# Patient Record
Sex: MALE | Race: ASIAN | NOT HISPANIC OR LATINO | URBAN - METROPOLITAN AREA
[De-identification: names, ages, dates, MRNs, and addresses within clinical notes are randomized per-mention and may not be internally consistent; named-entity substitution may affect disease eponyms.]

---

## 2023-03-16 ENCOUNTER — INPATIENT (INPATIENT)
Facility: HOSPITAL | Age: 48
LOS: 3 days | Discharge: HOME | DRG: 64 | End: 2023-03-20
Attending: PSYCHIATRY & NEUROLOGY | Admitting: PSYCHIATRY & NEUROLOGY
Payer: MEDICAID

## 2023-03-16 VITALS
DIASTOLIC BLOOD PRESSURE: 100 MMHG | WEIGHT: 184.97 LBS | OXYGEN SATURATION: 98 % | HEIGHT: 70 IN | SYSTOLIC BLOOD PRESSURE: 153 MMHG | HEART RATE: 77 BPM | RESPIRATION RATE: 19 BRPM | TEMPERATURE: 98 F

## 2023-03-16 PROCEDURE — 99285 EMERGENCY DEPT VISIT HI MDM: CPT

## 2023-03-16 NOTE — ED ADULT TRIAGE NOTE - CHIEF COMPLAINT QUOTE
C/o dizziness and vomiting since Tuesday. Denies abdominal pain, fever. Seen at [place] and was prescribed Meclizine, states no relief

## 2023-03-16 NOTE — ED ADULT TRIAGE NOTE - GLASGOW COMA SCALE: BEST MOTOR RESPONSE, MLM
Medication(s) Requested: tramadol  Last office visit: 10/29/18  Last refill: 11/5/18  Is the patient due for refill of this medication(s): Yes  PDMP review: Criteria met. Forwarded to Physician/JAYLON for signature.     
Patient is looking for refill of Tramadol to [facility] in [place] and [facility] and she is taking the last one today.    Please call.       
Prescription e prescribed and  Voice message left with patient    
(M6) obeys commands

## 2023-03-17 DIAGNOSIS — I77.74 DISSECTION OF VERTEBRAL ARTERY: ICD-10-CM

## 2023-03-17 LAB
A1C WITH ESTIMATED AVERAGE GLUCOSE RESULT: 7.4 % — HIGH (ref 4–5.6)
ALBUMIN SERPL ELPH-MCNC: 4.3 G/DL — SIGNIFICANT CHANGE UP (ref 3.3–5)
ALP SERPL-CCNC: 76 U/L — SIGNIFICANT CHANGE UP (ref 40–120)
ALT FLD-CCNC: 16 U/L — SIGNIFICANT CHANGE UP (ref 10–45)
ANION GAP SERPL CALC-SCNC: 12 MMOL/L — SIGNIFICANT CHANGE UP (ref 5–17)
AST SERPL-CCNC: 15 U/L — SIGNIFICANT CHANGE UP (ref 10–40)
BASOPHILS # BLD AUTO: 0.03 K/UL — SIGNIFICANT CHANGE UP (ref 0–0.2)
BASOPHILS NFR BLD AUTO: 0.2 % — SIGNIFICANT CHANGE UP (ref 0–2)
BILIRUB SERPL-MCNC: 0.4 MG/DL — SIGNIFICANT CHANGE UP (ref 0.2–1.2)
BUN SERPL-MCNC: 14 MG/DL — SIGNIFICANT CHANGE UP (ref 7–23)
CALCIUM SERPL-MCNC: 9.4 MG/DL — SIGNIFICANT CHANGE UP (ref 8.4–10.5)
CHLORIDE SERPL-SCNC: 96 MMOL/L — SIGNIFICANT CHANGE UP (ref 96–108)
CHOLEST SERPL-MCNC: 192 MG/DL — SIGNIFICANT CHANGE UP
CO2 SERPL-SCNC: 27 MMOL/L — SIGNIFICANT CHANGE UP (ref 22–31)
CREAT SERPL-MCNC: 0.88 MG/DL — SIGNIFICANT CHANGE UP (ref 0.5–1.3)
CRP SERPL-MCNC: 5 MG/L — HIGH (ref 0–4)
EGFR: 105 ML/MIN/1.73M2 — SIGNIFICANT CHANGE UP
EOSINOPHIL # BLD AUTO: 0 K/UL — SIGNIFICANT CHANGE UP (ref 0–0.5)
EOSINOPHIL NFR BLD AUTO: 0 % — SIGNIFICANT CHANGE UP (ref 0–6)
ESTIMATED AVERAGE GLUCOSE: 166 MG/DL — HIGH (ref 68–114)
FLUAV AG NPH QL: SIGNIFICANT CHANGE UP
FLUBV AG NPH QL: SIGNIFICANT CHANGE UP
GLUCOSE SERPL-MCNC: 180 MG/DL — HIGH (ref 70–99)
HCT VFR BLD CALC: 41.2 % — SIGNIFICANT CHANGE UP (ref 39–50)
HDLC SERPL-MCNC: 42 MG/DL — SIGNIFICANT CHANGE UP
HGB BLD-MCNC: 13.1 G/DL — SIGNIFICANT CHANGE UP (ref 13–17)
IMM GRANULOCYTES NFR BLD AUTO: 1.1 % — HIGH (ref 0–0.9)
LIPID PNL WITH DIRECT LDL SERPL: 114 MG/DL — HIGH
LYMPHOCYTES # BLD AUTO: 1.29 K/UL — SIGNIFICANT CHANGE UP (ref 1–3.3)
LYMPHOCYTES # BLD AUTO: 8.3 % — LOW (ref 13–44)
MCHC RBC-ENTMCNC: 26.5 PG — LOW (ref 27–34)
MCHC RBC-ENTMCNC: 31.8 GM/DL — LOW (ref 32–36)
MCV RBC AUTO: 83.4 FL — SIGNIFICANT CHANGE UP (ref 80–100)
MONOCYTES # BLD AUTO: 0.66 K/UL — SIGNIFICANT CHANGE UP (ref 0–0.9)
MONOCYTES NFR BLD AUTO: 4.3 % — SIGNIFICANT CHANGE UP (ref 2–14)
NEUTROPHILS # BLD AUTO: 13.36 K/UL — HIGH (ref 1.8–7.4)
NEUTROPHILS NFR BLD AUTO: 86.1 % — HIGH (ref 43–77)
NON HDL CHOLESTEROL: 150 MG/DL — HIGH
NRBC # BLD: 0 /100 WBCS — SIGNIFICANT CHANGE UP (ref 0–0)
PLATELET # BLD AUTO: 271 K/UL — SIGNIFICANT CHANGE UP (ref 150–400)
POTASSIUM SERPL-MCNC: 4.2 MMOL/L — SIGNIFICANT CHANGE UP (ref 3.5–5.3)
POTASSIUM SERPL-SCNC: 4.2 MMOL/L — SIGNIFICANT CHANGE UP (ref 3.5–5.3)
PROT SERPL-MCNC: 7.9 G/DL — SIGNIFICANT CHANGE UP (ref 6–8.3)
RBC # BLD: 4.94 M/UL — SIGNIFICANT CHANGE UP (ref 4.2–5.8)
RBC # FLD: 12.4 % — SIGNIFICANT CHANGE UP (ref 10.3–14.5)
RSV RNA NPH QL NAA+NON-PROBE: SIGNIFICANT CHANGE UP
SARS-COV-2 RNA SPEC QL NAA+PROBE: SIGNIFICANT CHANGE UP
SODIUM SERPL-SCNC: 135 MMOL/L — SIGNIFICANT CHANGE UP (ref 135–145)
TRIGL SERPL-MCNC: 178 MG/DL — HIGH
WBC # BLD: 15.51 K/UL — HIGH (ref 3.8–10.5)
WBC # FLD AUTO: 15.51 K/UL — HIGH (ref 3.8–10.5)

## 2023-03-17 PROCEDURE — 93306 TTE W/DOPPLER COMPLETE: CPT | Mod: 26

## 2023-03-17 PROCEDURE — 70551 MRI BRAIN STEM W/O DYE: CPT | Mod: 26

## 2023-03-17 PROCEDURE — 99291 CRITICAL CARE FIRST HOUR: CPT

## 2023-03-17 PROCEDURE — 70498 CT ANGIOGRAPHY NECK: CPT | Mod: 26,MG

## 2023-03-17 PROCEDURE — 70547 MR ANGIOGRAPHY NECK W/O DYE: CPT | Mod: 26

## 2023-03-17 PROCEDURE — 70496 CT ANGIOGRAPHY HEAD: CPT | Mod: 26,MG

## 2023-03-17 PROCEDURE — G1004: CPT

## 2023-03-17 RX ORDER — ASPIRIN/CALCIUM CARB/MAGNESIUM 324 MG
81 TABLET ORAL DAILY
Refills: 0 | Status: DISCONTINUED | OUTPATIENT
Start: 2023-03-17 | End: 2023-03-20

## 2023-03-17 RX ORDER — ENOXAPARIN SODIUM 100 MG/ML
40 INJECTION SUBCUTANEOUS EVERY 24 HOURS
Refills: 0 | Status: DISCONTINUED | OUTPATIENT
Start: 2023-03-17 | End: 2023-03-20

## 2023-03-17 RX ORDER — ATORVASTATIN CALCIUM 80 MG/1
80 TABLET, FILM COATED ORAL AT BEDTIME
Refills: 0 | Status: DISCONTINUED | OUTPATIENT
Start: 2023-03-17 | End: 2023-03-20

## 2023-03-17 RX ORDER — SODIUM CHLORIDE 9 MG/ML
1000 INJECTION, SOLUTION INTRAVENOUS
Refills: 0 | Status: DISCONTINUED | OUTPATIENT
Start: 2023-03-17 | End: 2023-03-20

## 2023-03-17 RX ORDER — INSULIN LISPRO 100/ML
VIAL (ML) SUBCUTANEOUS AT BEDTIME
Refills: 0 | Status: DISCONTINUED | OUTPATIENT
Start: 2023-03-17 | End: 2023-03-20

## 2023-03-17 RX ORDER — DEXTROSE 50 % IN WATER 50 %
25 SYRINGE (ML) INTRAVENOUS ONCE
Refills: 0 | Status: DISCONTINUED | OUTPATIENT
Start: 2023-03-17 | End: 2023-03-20

## 2023-03-17 RX ORDER — ASPIRIN/CALCIUM CARB/MAGNESIUM 324 MG
81 TABLET ORAL DAILY
Refills: 0 | Status: DISCONTINUED | OUTPATIENT
Start: 2023-03-17 | End: 2023-03-17

## 2023-03-17 RX ORDER — SODIUM CHLORIDE 9 MG/ML
1000 INJECTION INTRAMUSCULAR; INTRAVENOUS; SUBCUTANEOUS ONCE
Refills: 0 | Status: COMPLETED | OUTPATIENT
Start: 2023-03-17 | End: 2023-03-17

## 2023-03-17 RX ORDER — ONDANSETRON 8 MG/1
4 TABLET, FILM COATED ORAL ONCE
Refills: 0 | Status: COMPLETED | OUTPATIENT
Start: 2023-03-17 | End: 2023-03-17

## 2023-03-17 RX ORDER — CLOPIDOGREL BISULFATE 75 MG/1
75 TABLET, FILM COATED ORAL DAILY
Refills: 0 | Status: DISCONTINUED | OUTPATIENT
Start: 2023-03-17 | End: 2023-03-17

## 2023-03-17 RX ORDER — DIAZEPAM 5 MG
2 TABLET ORAL ONCE
Refills: 0 | Status: DISCONTINUED | OUTPATIENT
Start: 2023-03-17 | End: 2023-03-17

## 2023-03-17 RX ORDER — INSULIN LISPRO 100/ML
VIAL (ML) SUBCUTANEOUS
Refills: 0 | Status: DISCONTINUED | OUTPATIENT
Start: 2023-03-17 | End: 2023-03-20

## 2023-03-17 RX ORDER — DIAZEPAM 5 MG
5 TABLET ORAL ONCE
Refills: 0 | Status: DISCONTINUED | OUTPATIENT
Start: 2023-03-17 | End: 2023-03-17

## 2023-03-17 RX ORDER — ONDANSETRON 8 MG/1
4 TABLET, FILM COATED ORAL EVERY 6 HOURS
Refills: 0 | Status: DISCONTINUED | OUTPATIENT
Start: 2023-03-17 | End: 2023-03-20

## 2023-03-17 RX ORDER — MECLIZINE HCL 12.5 MG
25 TABLET ORAL ONCE
Refills: 0 | Status: COMPLETED | OUTPATIENT
Start: 2023-03-17 | End: 2023-03-17

## 2023-03-17 RX ORDER — GLUCAGON INJECTION, SOLUTION 0.5 MG/.1ML
1 INJECTION, SOLUTION SUBCUTANEOUS ONCE
Refills: 0 | Status: DISCONTINUED | OUTPATIENT
Start: 2023-03-17 | End: 2023-03-20

## 2023-03-17 RX ORDER — ATORVASTATIN CALCIUM 80 MG/1
80 TABLET, FILM COATED ORAL AT BEDTIME
Refills: 0 | Status: DISCONTINUED | OUTPATIENT
Start: 2023-03-17 | End: 2023-03-17

## 2023-03-17 RX ORDER — DEXTROSE 50 % IN WATER 50 %
15 SYRINGE (ML) INTRAVENOUS ONCE
Refills: 0 | Status: DISCONTINUED | OUTPATIENT
Start: 2023-03-17 | End: 2023-03-20

## 2023-03-17 RX ORDER — BISOPROLOL FUMARATE AND HYDROCHLOROTHIAZIDE 5; 6.25 MG/1; MG/1
1 TABLET ORAL
Qty: 0 | Refills: 0 | DISCHARGE

## 2023-03-17 RX ORDER — ENOXAPARIN SODIUM 100 MG/ML
40 INJECTION SUBCUTANEOUS EVERY 24 HOURS
Refills: 0 | Status: DISCONTINUED | OUTPATIENT
Start: 2023-03-17 | End: 2023-03-17

## 2023-03-17 RX ORDER — DEXTROSE 50 % IN WATER 50 %
12.5 SYRINGE (ML) INTRAVENOUS ONCE
Refills: 0 | Status: DISCONTINUED | OUTPATIENT
Start: 2023-03-17 | End: 2023-03-20

## 2023-03-17 RX ADMIN — Medication 2: at 17:48

## 2023-03-17 RX ADMIN — Medication 5 MILLIGRAM(S): at 05:10

## 2023-03-17 RX ADMIN — SODIUM CHLORIDE 1000 MILLILITER(S): 9 INJECTION INTRAMUSCULAR; INTRAVENOUS; SUBCUTANEOUS at 04:30

## 2023-03-17 RX ADMIN — ONDANSETRON 4 MILLIGRAM(S): 8 TABLET, FILM COATED ORAL at 04:30

## 2023-03-17 RX ADMIN — Medication 25 MILLIGRAM(S): at 04:30

## 2023-03-17 RX ADMIN — Medication 81 MILLIGRAM(S): at 14:37

## 2023-03-17 RX ADMIN — ATORVASTATIN CALCIUM 80 MILLIGRAM(S): 80 TABLET, FILM COATED ORAL at 21:47

## 2023-03-17 RX ADMIN — ENOXAPARIN SODIUM 40 MILLIGRAM(S): 100 INJECTION SUBCUTANEOUS at 21:47

## 2023-03-17 RX ADMIN — Medication 2 MILLIGRAM(S): at 16:18

## 2023-03-17 NOTE — CONSULT NOTE ADULT - ASSESSMENT
LeleBeth  47M hx of HTN/DM who initally presented to OSH with dizziness on 3/15 was discharged on meclizine due to neg CT/CTA. 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 Has mild compression against the 4th on CT. No hydro. Currently on ASA/Plavix. Exam: intact     -Will follow for SOC watch  -Should hold plavix but can continue ASA 81 until SOC watch period is over  -Repeat CT head in AM  -MRI brain

## 2023-03-17 NOTE — PATIENT PROFILE ADULT - DO YOU FEEL THREATENED BY OTHERS?
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

## 2023-03-17 NOTE — ED PROVIDER NOTE - PHYSICAL EXAMINATION
GENERAL: NAD  HEENT:  Atraumatic  CHEST/LUNG: Chest rise equal bilaterally  HEART: Regular rate and rhythm  ABDOMEN: Soft, Nontender, Nondistended  EXTREMITIES:  Extremities warm  PSYCH: A&Ox3  SKIN: No obvious rashes or lesions  NEUROLOGY: strength and sensation intact in all extremities. CN 2 - 12 intact. Finger to nose test intact. No pronator drift. GENERAL: NAD  HEENT:  Atraumatic  CHEST/LUNG: Chest rise equal bilaterally  HEART: Regular rate and rhythm  ABDOMEN: Soft, Nontender, Nondistended  EXTREMITIES:  Extremities warm  PSYCH: A&Ox3  SKIN: No obvious rashes or lesions  NEUROLOGY: strength and sensation intact in all extremities. CN 2 - 12 intact. Finger to nose test intact. No pronator drift. No dysdiadochokinesia. No skew (test of skew), NIH stroke 0.

## 2023-03-17 NOTE — SPEECH LANGUAGE PATHOLOGY EVALUATION - SLP CONVERSATIONAL SPEECH
WFL during conversation. However, difficulty with cookie theft picture description task. Difficulty accurately describing trouble given limitations of ; however, per /spouse: "He had problem giving answers. I couldn't understanding what he was saying. I needed to clarify a lot."

## 2023-03-17 NOTE — SPEECH LANGUAGE PATHOLOGY EVALUATION - SLP DIAGNOSIS
46 y/o M p/w room spinning dizziness upon movement with severe diffuse narrowing/occlusion of the R vertebral artery at the level of balilar artery likely 2/2 R vertebral dissection and occlusion of the R PICA. Hypodense area within the right inferior cerebellum concerning for acute infarction in the right PICA territory on CT imaging. Pt was seen today for Speech Language Evaluation which revealed mild cognitive-linguistic deficits and mild language deficits. Receptive language deficits characterized by difficulty accurately following multi-step directions or answering complex Y/N questions. Expressive language deficits characterized by difficulty with picture description task. No word-finding difficulties noted during object/picture naming tasks. Cognitive-linguistic deficits noted in the domains of working memory/short story recall and executive function. Difficulty with hand placement with clock drawing task. No dysarthria appreciated.

## 2023-03-17 NOTE — OCCUPATIONAL THERAPY INITIAL EVALUATION ADULT - LEVEL OF CONSCIOUSNESS, OT EVAL
Pt scored 0/28 on SBT utilizing . However as per Speech Pt with higher level gognotove issues and may benefit from more in depth testing./alert

## 2023-03-17 NOTE — ED PROVIDER NOTE - CLINICAL SUMMARY MEDICAL DECISION MAKING FREE TEXT BOX
48 y/o male w/ PMH HTN, DM c/o 4 day history of nausea, NBNB vomiting x1, and vertigo, minimally relieved w/ meclizine, worsened w/ walking and moving. Denies fevers, chills, chest pain, SOB, abdominal pain, dysuria, hematuria. Pt was seen at [facility] and had bloodwork. Will treat symptomatically. Normal neuro exam, equivocable HINTS exam w/ corrective horrizontal saccade. Will CT head and neck angio to screen for vascular artery pathology.

## 2023-03-17 NOTE — OCCUPATIONAL THERAPY INITIAL EVALUATION ADULT - PERTINENT HX OF CURRENT PROBLEM, REHAB EVAL
47y (1975) man with a PMHx significant for HTN and DM who presented to the ED for worsening Dizziness. Starting on 3/15 early morning pt woke up from sleep with sweating and room spinning dizziness which then only became symptomatic upon movement. Pt went to [facility] and received a CT, CTA which he says was negative, was discharged on meclizine. While at home, pt continued to be symptomatic upon ambulation and had one episode of vomiting, went to [facility] for further evaluation. Pt denied any vision changes, numbness, weakness, slurred speech.  Not a TNK candidate due to presentation outside the window. Not a candidate for thrombectomy due to no LVO on imaging. Hosp Course: 3/17: CT HEAD: Hypodense area within the right inferior cerebellum concerning for acute infarction in the right PICA territory. CTA BRAIN/NECK: Right vertebral dissection with occlusion of the right PICA.

## 2023-03-17 NOTE — SPEECH LANGUAGE PATHOLOGY EVALUATION - SLP GENERAL OBSERVATIONS
Pt was received at bedside awake and alert with spouse present. +room air, +tele (VSS). Language Line utilized for translation to Gujarati (Chacho #[id number]). He was cooperative with evaluation.

## 2023-03-17 NOTE — OCCUPATIONAL THERAPY INITIAL EVALUATION ADULT - ADDITIONAL COMMENTS
Pt reports that he lives with his wife in an apt, +elevator, no steps to manage. PTA pt was independent in all adl's and functional mobility

## 2023-03-17 NOTE — ED PROVIDER NOTE - OBJECTIVE STATEMENT
46 y/o male w/ PMH HTN, DM c/o 4 day history of nausea, NBNB vomiting x1, and vertigo, minimally relieved w/ meclizine, worsened w/ walking and moving. Denies fevers, chills, chest pain, SOB, abdominal pain, dysuria, hematuria. Pt was seen at [facility] and had bloodwork.

## 2023-03-17 NOTE — H&P ADULT - ASSESSMENT
47y (1975) man with a PMHx significant for HTN and DM who presented to the ED for worsening Dizziness. Starting on 3/15 early morning pt woke up from sleep with sweating and room spinning dizziness which then only became symptomatic upon movement. Pt went to [facility] and received a CT, CTA which he says was negative, was discharged on meclizine. While at home, pt continued to be symptomatic upon ambulation and had one episode of vomiting, went to [facility] for further evaluation. Pt denied any vision changes, numbness, weakness, slurred speech.   While in the ED, /100 other vss. NIHSS 0, MRS 0. 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     Not a TNK candidate due to presentation outside the window.   Not a candidate for thrombectomy due to no LVO on imaging.     Impression: Room spinning dizziness upon movement with severe diffuse narrowing/occlusion of the R vertebral artery at the level of balilar artery likely 2/2 R vertebral dissection and occlusion of the R PICA    Plan  Admit to stroke unit     Imaging/Labs  [] MRI brain w/o contrast  [] MRA H/N to look at the cerebral vasculature with MR NOVA and T1 fat sat    [] TTE   [] HbA1C and Lipid Panel.    Meds  [] Aspirin 81mg and [id number] for 3 weeks followed by ASA 81 alone indefinitely per CHANCE trial   [] Aspirin 81MG PO daily (ASA 300MG NV daily if patient fails dysphagia screen)  [] Atorvastatin 80MG QHS, titrate to LDL<70  [] DVT prophylaxis - Lovenox 40MG SC daily    Other  [] STRICT BEDREST  [] Telemonitoring; Neurochecks and vital signs per unit protocol, Q4H  [] Permissive HTN up to 220/120 mmHg for first 24 hours after symptom onset followed by gradual normotension.   [] BG goal <180, avoid hypoglycemia  [] NPO until clears dysphagia screen, otherwise swallow evaluation  [] Fall, aspiration precautions  [] PT/OT eval  [] Stroke education provided     Case d/w stroke fellow under the supervision of stroke attending     Case to be seen by stroke attending during AM rounds

## 2023-03-17 NOTE — H&P ADULT - HISTORY OF PRESENT ILLNESS
HPI: Patient VINEET DUGAN is a 47y (1975) man with a PMHx significant for HTN and DM who presented to the ED for worsening Dizziness. Starting on 3/15 early morning pt woke up from sleep with sweating and room spinning dizziness which then only became symptomatic upon movement. Pt went to [facility] and received a CT, CTA which he says was negative, was discharged on meclizine. While at home, pt continued to be symptomatic upon ambulation and had one episode of vomiting, went to [facility] for further evaluation. Pt denied any vision changes, numbness, weakness, slurred speech.   While in the ED, /100 other vss. NIHSS 0, MRS 0. 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

## 2023-03-17 NOTE — ED ADULT NURSE NOTE - OBJECTIVE STATEMENT
47y Male presents to the ED c/o 4 days of nausea, NBNB vomiting, vertigo, and worsening dizziness with walking. PMH HTN, and DM. Pt denies fevers, chills, CP, SOB, abd pain, dysuria, and hematuria. Pt is A&Ox4, and ambulatory. Respirations spontaneous, unlabored, and equal bilaterally. Pts IV placed in LAC 20 gauge. Patient safety maintained, bed is in lowest position, wheels locked, and appropriate side rails raised.

## 2023-03-17 NOTE — SPEECH LANGUAGE PATHOLOGY EVALUATION - COMMENTS
IMAGING:  CT HEAD: 3/17/23  IMPRESSION: Hypodense area within the right inferior cerebellum concerning for acute infarction in the right PICA territory.    Pt is unknown to this service.    Of note, pt passed dysphagia screener 3/17/23 at 09:02. Per Henny FOWLER, defer bedside swallow evaluation as not indicated at this time. SHANI-- no Freddie text available. GOALS:  1. Pt will improve receptive/expressive language skills for functional communication.  2. Pt will improve cognitive-linguistic skills for functional communication.     Results and recommendations d/w pt, RNNellie, and Henny FOWLER.

## 2023-03-17 NOTE — PHYSICAL THERAPY INITIAL EVALUATION ADULT - NSPTDMEREC_GEN_A_CORE
Patient will require a rolling walker due to decreased strength, endurance and balance to optimize independence for a safe discharge home./rolling walker

## 2023-03-17 NOTE — H&P ADULT - NSHPADDITIONALINFOADULT_GEN_ALL_CORE
Fellow Attestation:    Patient is a 46yo RH man with a PMHx significant for HTN and DM who presented to the ED with concerns of subacute dizziness and gait imbalance. While in the ED, /100 other vss. NIHSS 0, MRS 0. 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 Patient denies any traumatic or inciting event that may have predisposed patient to dissection suggesting that this is entirely spontaneous. Patients complaints of dizziness and gait imbalance are likely consistent with a cerebellar syndrome. Complaints remain present to this morning and patient is requiring very mild assistance ambulating and would expect outpatient rehabilitation as disposition destination.      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   [x] BG goal <180, avoid hypoglycemia  [x] Fall, aspiration precautions  [x] PT/OT eval  [x] Stroke education provided Fellow Attestation: Dr. Melo Mistry    Patient is a 46yo RH man with a PMHx significant for HTN and DM who presented to the ED with concerns of subacute dizziness and gait imbalance. While in the ED, /100 other vss. NIHSS 0, MRS 0. 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 Patient denies any traumatic or inciting event that may have predisposed patient to dissection suggesting that this is entirely spontaneous. Patients complaints of dizziness and gait imbalance are likely consistent with a cerebellar syndrome. Complaints remain present to this morning and patient is requiring very mild assistance ambulating and would expect outpatient rehabilitation as disposition destination.      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   [x] BG goal <180, avoid hypoglycemia  [x] Fall, aspiration precautions  [x] PT/OT eval  [x] Stroke education provided No lymphadedenopathy

## 2023-03-17 NOTE — PHYSICAL THERAPY INITIAL EVALUATION ADULT - ADDITIONAL COMMENTS
Pt lives in an apartment with his wife, no steps required. Pt was independent with all functional mobility and ADLs prior to admission without AD.

## 2023-03-17 NOTE — H&P ADULT - ATTENDING COMMENTS
Mr. Royal is a very pleasant 47-year-old man who was admitted with right cerebellar ischemic infarct likely secondary to right vertebral artery dissection.  Initially presented to [facility], discharged after noncontrast head CT, kept having recurrent symptoms of vertigo dizziness and imbalance that prompted him to come to [facility].  On review of imaging he has large right cerebellar infarction with petechial hemorrhagic component, mild mass effect but no obscuration of fourth ventricle, no hydrocephalus  On neurological exam he is completely awake, alert oriented x4, strength intact 5/5 all 4 extremities, no dysmetria or nystagmus.  Gait exam was deferred    Impression: Right cerebellar infarction (since 3/14)  Mild mass effect and cerebral edema, right vertebral artery and right PICA occlusion likely secondary to spontaneous vertebral artery dissection    Recommendations:  We will continue aspirin 81 mg, neurosurgery consultation, neuro monitoring, stat head CT if any change in mental status or neurological worsening  DVT prophylaxis with Lovenox  PT OT PMR Mr. Royal is a very pleasant 47-year-old man who was admitted with right cerebellar ischemic infarct likely secondary to right vertebral artery dissection.  Initially presented to [facility], discharged after noncontrast head CT, kept having recurrent symptoms of vertigo dizziness and imbalance that prompted him to come to [facility].  On review of imaging he has large right cerebellar infarction with petechial hemorrhagic component, mild mass effect but no obscuration of fourth ventricle, no hydrocephalus  On neurological exam he is completely awake, alert oriented x4, strength intact 5/5 all 4 extremities, no dysmetria or nystagmus.  Gait exam was deferred    Impression: Right cerebellar infarction (since 3/14)  Mild mass effect and cerebral edema, right vertebral artery and right PICA occlusion likely secondary to spontaneous vertebral artery dissection    Recommendations:  We will continue aspirin 81 mg, neurosurgery consultation, neuro monitoring, stat head CT if any change in mental status or neurological worsening  DVT prophylaxis with Lovenox  PT OT PMR    Risk factor control - Continue Rx for DM and HLD,

## 2023-03-17 NOTE — ED ADULT NURSE NOTE - CHIEF COMPLAINT QUOTE
C/o dizziness and vomiting since Tuesday. Denies abdominal pain, fever. Seen at [facility] and was prescribed Meclizine, states no relief

## 2023-03-17 NOTE — PATIENT PROFILE ADULT - FALL HARM RISK - HARM RISK INTERVENTIONS
Assistance with ambulation/Assistance OOB with selected safe patient handling equipment/Communicate Risk of Fall with Harm to all staff/Monitor gait and stability/Reinforce activity limits and safety measures with patient and family/Sit up slowly, dangle for a short time, stand at bedside before walking/Tailored Fall Risk Interventions/Visual Cue: Yellow wristband and red socks/Bed in lowest position, wheels locked, appropriate side rails in place/Call bell, personal items and telephone in reach/Instruct patient to call for assistance before getting out of bed or chair/Non-slip footwear when patient is out of bed/[place] to call system/Physically safe environment - no spills, clutter or unnecessary equipment/Purposeful Proactive Rounding/Room/bathroom lighting operational, light cord in reach

## 2023-03-17 NOTE — SPEECH LANGUAGE PATHOLOGY EVALUATION - CONFRONTATIONAL NAMING
100% accuracy; No lengthy pauses for word-finding appreciated during object/picture naming tasks./intact

## 2023-03-17 NOTE — CONSULT NOTE ADULT - SUBJECTIVE AND OBJECTIVE BOX
Patient seen and examined at bedside.    --Anticoagulation--  aspirin  chewable 81 milliGRAM(s) Oral daily    T(C): 36.7 (03-17-23 @ 10:53), Max: 36.8 (03-17-23 @ 02:20)  HR: 66 (03-17-23 @ 12:00) (59 - 77)  BP: 162/102 (03-17-23 @ 12:00) (136/88 - 173/96)  RR: 18 (03-17-23 @ 12:00) (18 - 19)  SpO2: 100% (03-17-23 @ 12:00) (98% - 100%)  Wt(kg): --    Exam: AO3, PERRL, EOMI, no droop/drift, HALEY 5/5, SILT

## 2023-03-17 NOTE — H&P ADULT - NSHPPHYSICALEXAM_GEN_ALL_CORE
Vitals:  T(C): 36.1 (03-17-23 @ 06:41), Max: 36.8 (03-17-23 @ 02:20)  HR: 59 (03-17-23 @ 06:41) (59 - 77)  BP: 173/96 (03-17-23 @ 06:41) (136/88 - 173/96)  RR: 19 (03-17-23 @ 06:41) (18 - 19)  SpO2: 98% (03-17-23 @ 06:41) (98% - 99%)    Physical Examination:     Mental status - Awake, Alert, Oriented to person, place, and time. Speech fluent, repetition and naming intact. Follows simple and complex commands.     Cranial nerves - PERRL, VFF, EOMI, no nystagmus,  face sensation (V1-V3) intact LT, No facial asymmetry b/l, hearing grossly intact b/l, trapezius 5/5 strength b/l, tongue midline on protrusion     Upon movement becomes vertiginous but no nystagmus elicited on exam.     Motor - Normal bulk and tone throughout. No pronator drift.  Strength testing            Deltoid      Biceps      Triceps     Wrist Extension    Wrist Flexion       R            5                 5               5                     5                              5                        5  L             5                 5               5                     5                              5                       5              Hip Flexion    Hip Extension    Knee Flexion    Knee Extension    Dorsiflexion    Plantar Flexion  R              5                           5                       5                           5                            5                          5  L              5                           5                        5                           5                            5                          5    Sensation - Light touch/temperature intact throughout    DTR's -             Biceps      Triceps     Brachioradialis      Patellar    Ankle    Toes/plantar response  R             2+             2+                  2+                       2+            2+                 Down  L              2+             2+                 2+                        2+           2+                 Down    Coordination - Finger to Nose intact b/l. No tremors appreciated    Gait and station - Cannot ambulate gets vertiginous. Romberg (+)

## 2023-03-17 NOTE — SPEECH LANGUAGE PATHOLOGY EVALUATION - SLP PERTINENT HISTORY OF CURRENT PROBLEM
48 y/o M with PMH of HTN and DM p/w worsening dizziness. Morning of 3/15 pt woke up with sweating and room spinning dizziness. Went to [facility] and received a CT, CTA which he says was negative, d/c'd on meclizine. At home, pt continued to be symptomatic upon ambulation and had one episode of vomiting, went to [facility] for further evaluation. Pt denied any vision changes, numbness, weakness, slurred speech. While in the ED, /100 other vss. NIHSS 0, MRS 0. 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 Not a TNK candidate due to presentation outside the window. Not a candidate for thrombectomy due to no LVO on imaging. Impression: Room spinning dizziness upon movement with severe diffuse narrowing/occlusion of the R vertebral artery at the level of balilar artery likely 2/2 R vertebral dissection and occlusion of the R PICA. Admitted to stroke unit.

## 2023-03-17 NOTE — OCCUPATIONAL THERAPY INITIAL EVALUATION ADULT - DIAGNOSIS, OT EVAL
Patient presents with decreased balance, s impacting ability to perform ADLs and functional mobility

## 2023-03-17 NOTE — H&P ADULT - NSHPLABSRESULTS_GEN_ALL_CORE
Labs:                        13.1   15.51 )-----------( 271      ( 17 Mar 2023 04:55 )             41.2     03-17    135  |  96  |  14  ----------------------------<  180<H>  4.2   |  27  |  0.88    Ca    9.4      17 Mar 2023 04:55    TPro  7.9  /  Alb  4.3  /  TBili  0.4  /  DBili  x   /  AST  15  /  ALT  16  /  AlkPhos  76  03-17    CAPILLARY BLOOD GLUCOSE        LIVER FUNCTIONS - ( 17 Mar 2023 04:55 )  Alb: 4.3 g/dL / Pro: 7.9 g/dL / ALK PHOS: 76 U/L / ALT: 16 U/L / AST: 15 U/L / GGT: x               Radiology:  CT Head No Cont:  (17 Mar 2023 05:10)  CT HEAD: Hypodense area within the right inferior cerebellum concerning   for acute infarction in the right PICA territory.    CTA BRAIN/NECK: Right vertebral dissection with occlusion of the right   PICA.

## 2023-03-17 NOTE — ED PROVIDER NOTE - ATTENDING CONTRIBUTION TO CARE
Attending MD White: I personally have seen and examined this patient.  Resident note reviewed and agree on plan of care and except where noted.  See below for details.     Seen in [facility], accompanied by friend, iris Devi     47M with PMH/PSH including HTN, DM presents to the ED with one episode of nonbloody, nonbilious vomiting, vertigo presents to the ED with vertigo.  Reports that on 3/15/23 woke up from sleep with sensation of dizziness.  Reports movement made it worse.  Reports went to [facility] where he reports he had imaging but unable to find report of imaging in discharge paperwork.  Reports was discharged on Meclizine without improvement.  Reports has unstable gait.  Reports dizziness possible worse with ranging neck/head. Denies change in vision, double vision, sudden loss of vision, change in hearing, tinnitus.  Denies change in speech.  Denies injury, trauma.  Exam:   General: NAD  HENT: head NCAT, airway patent, equivocal Caesar Hallpike to R  Eyes: anicteric, no conjunctival injection ,PERRL, EOMI  Lungs: lungs CTAB with good inspiratory effort, no wheezing, no rhonchi, no rales  Cardiac: +S1S2, no obvious m/r/g  GI: abdomen soft with +BS, NT, ND  : no CVAT  MSK: ranging neck and extremities freely  Neuro: moving all extremities spontaneously with 5/5 strength, nonfocal, unable to test gait as patient declined  Psych: normal mood and affect     A/P: 47M with dizziness, gait instability, will evaluate for but not limited to peripheral vs central vertigo, concern for BPPV vs posterior stroke, neck vessel injury, will consult neuro, will obtain labs for metabolic derangement, CTH/CTAHN, will give Valium, EKG for arrhythmia, reassess

## 2023-03-18 ENCOUNTER — TRANSCRIPTION ENCOUNTER (OUTPATIENT)
Age: 48
End: 2023-03-18

## 2023-03-18 LAB
ANION GAP SERPL CALC-SCNC: 12 MMOL/L — SIGNIFICANT CHANGE UP (ref 5–17)
BUN SERPL-MCNC: 13 MG/DL — SIGNIFICANT CHANGE UP (ref 7–23)
CALCIUM SERPL-MCNC: 9.1 MG/DL — SIGNIFICANT CHANGE UP (ref 8.4–10.5)
CHLORIDE SERPL-SCNC: 100 MMOL/L — SIGNIFICANT CHANGE UP (ref 96–108)
CO2 SERPL-SCNC: 25 MMOL/L — SIGNIFICANT CHANGE UP (ref 22–31)
CREAT SERPL-MCNC: 0.83 MG/DL — SIGNIFICANT CHANGE UP (ref 0.5–1.3)
DSDNA AB FLD-ACNC: <0.2 AI — SIGNIFICANT CHANGE UP
EGFR: 107 ML/MIN/1.73M2 — SIGNIFICANT CHANGE UP
ENA SS-A AB FLD IA-ACNC: <0.2 AI — SIGNIFICANT CHANGE UP
ERYTHROCYTE [SEDIMENTATION RATE] IN BLOOD: 24 MM/HR — HIGH (ref 0–15)
GLUCOSE SERPL-MCNC: 151 MG/DL — HIGH (ref 70–99)
HCT VFR BLD CALC: 40.9 % — SIGNIFICANT CHANGE UP (ref 39–50)
HGB BLD-MCNC: 13.3 G/DL — SIGNIFICANT CHANGE UP (ref 13–17)
MCHC RBC-ENTMCNC: 26.9 PG — LOW (ref 27–34)
MCHC RBC-ENTMCNC: 32.5 GM/DL — SIGNIFICANT CHANGE UP (ref 32–36)
MCV RBC AUTO: 82.6 FL — SIGNIFICANT CHANGE UP (ref 80–100)
NRBC # BLD: 0 /100 WBCS — SIGNIFICANT CHANGE UP (ref 0–0)
PLATELET # BLD AUTO: 218 K/UL — SIGNIFICANT CHANGE UP (ref 150–400)
POTASSIUM SERPL-MCNC: 3.6 MMOL/L — SIGNIFICANT CHANGE UP (ref 3.5–5.3)
POTASSIUM SERPL-SCNC: 3.6 MMOL/L — SIGNIFICANT CHANGE UP (ref 3.5–5.3)
RBC # BLD: 4.95 M/UL — SIGNIFICANT CHANGE UP (ref 4.2–5.8)
RBC # FLD: 12.2 % — SIGNIFICANT CHANGE UP (ref 10.3–14.5)
RHEUMATOID FACT SERPL-ACNC: <10 IU/ML — SIGNIFICANT CHANGE UP (ref 0–13)
SODIUM SERPL-SCNC: 137 MMOL/L — SIGNIFICANT CHANGE UP (ref 135–145)
WBC # BLD: 8.81 K/UL — SIGNIFICANT CHANGE UP (ref 3.8–10.5)
WBC # FLD AUTO: 8.81 K/UL — SIGNIFICANT CHANGE UP (ref 3.8–10.5)

## 2023-03-18 PROCEDURE — 99233 SBSQ HOSP IP/OBS HIGH 50: CPT

## 2023-03-18 PROCEDURE — 70450 CT HEAD/BRAIN W/O DYE: CPT | Mod: 26

## 2023-03-18 RX ORDER — ACETAMINOPHEN 500 MG
650 TABLET ORAL EVERY 6 HOURS
Refills: 0 | Status: DISCONTINUED | OUTPATIENT
Start: 2023-03-18 | End: 2023-03-20

## 2023-03-18 RX ADMIN — Medication 81 MILLIGRAM(S): at 13:34

## 2023-03-18 RX ADMIN — Medication 0: at 13:33

## 2023-03-18 RX ADMIN — ENOXAPARIN SODIUM 40 MILLIGRAM(S): 100 INJECTION SUBCUTANEOUS at 21:48

## 2023-03-18 RX ADMIN — ATORVASTATIN CALCIUM 80 MILLIGRAM(S): 80 TABLET, FILM COATED ORAL at 21:49

## 2023-03-18 RX ADMIN — Medication 1: at 09:17

## 2023-03-18 NOTE — PROGRESS NOTE ADULT - SUBJECTIVE AND OBJECTIVE BOX
Patient is a 47y old  Male who presents with a chief complaint of Vertebral dissection (18 Mar 2023 13:39)      SUBJECTIVE / OVERNIGHT EVENTS:    Events noted.  CONSTITUTIONAL: No fever,  or fatigue  RESPIRATORY: No cough, wheezing,  No shortness of breath  CARDIOVASCULAR: No chest pain, palpitations, dizziness, or leg swelling  GASTROINTESTINAL: No abdominal or epigastric pain. No nausea, vomiting.  NEUROLOGICAL: No headache    MEDICATIONS  (STANDING):  aspirin  chewable 81 milliGRAM(s) Oral daily  atorvastatin 80 milliGRAM(s) Oral at bedtime  dextrose 5%. 1000 milliLiter(s) (50 mL/Hr) IV Continuous <Continuous>  dextrose 5%. 1000 milliLiter(s) (100 mL/Hr) IV Continuous <Continuous>  dextrose 50% Injectable 25 Gram(s) IV Push once  dextrose 50% Injectable 12.5 Gram(s) IV Push once  dextrose 50% Injectable 25 Gram(s) IV Push once  enoxaparin Injectable 40 milliGRAM(s) SubCutaneous every 24 hours  glucagon  Injectable 1 milliGRAM(s) IntraMuscular once  insulin lispro (ADMELOG) corrective regimen sliding scale   SubCutaneous three times a day before meals  insulin lispro (ADMELOG) corrective regimen sliding scale   SubCutaneous at bedtime    MEDICATIONS  (PRN):  acetaminophen     Tablet .. 650 milliGRAM(s) Oral every 6 hours PRN Mild Pain (1 - 3), Moderate Pain (4 - 6)  dextrose Oral Gel 15 Gram(s) Oral once PRN Blood Glucose LESS THAN 70 milliGRAM(s)/deciliter  ondansetron Injectable 4 milliGRAM(s) IV Push every 6 hours PRN Nausea and/or Vomiting        CAPILLARY BLOOD GLUCOSE      POCT Blood Glucose.: 229 mg/dL (18 Mar 2023 21:47)  POCT Blood Glucose.: 145 mg/dL (18 Mar 2023 19:52)  POCT Blood Glucose.: 157 mg/dL (18 Mar 2023 18:20)  POCT Blood Glucose.: 144 mg/dL (18 Mar 2023 13:32)  POCT Blood Glucose.: 169 mg/dL (18 Mar 2023 08:51)    I&O's Summary    17 Mar 2023 07:01  -  18 Mar 2023 07:00  --------------------------------------------------------  IN: 240 mL / OUT: 300 mL / NET: -60 mL    18 Mar 2023 07:01  -  19 Mar 2023 00:18  --------------------------------------------------------  IN: 840 mL / OUT: 0 mL / NET: 840 mL        T(C): 36.9 (03-18-23 @ 20:00), Max: 37.1 (03-18-23 @ 06:00)  HR: 70 (03-19-23 @ 00:00) (57 - 81)  BP: 134/96 (03-19-23 @ 00:00) (126/78 - 168/90)  RR: 17 (03-19-23 @ 00:00) (15 - 20)  SpO2: 98% (03-19-23 @ 00:00) (97% - 99%)    PHYSICAL EXAM:  GENERAL: NAD  NECK: Supple, No JVD  CHEST/LUNG: Clear to auscultation bilaterally; No wheezing.  HEART: Regular rate and rhythm; No murmurs, rubs, or gallops  ABDOMEN: Soft, Nontender, Nondistended; Bowel sounds present  EXTREMITIES:   No edema  NEUROLOGY: AAO X 3      LABS:                        13.3   8.81  )-----------( 218      ( 18 Mar 2023 05:57 )             40.9     03-18    137  |  100  |  13  ----------------------------<  151<H>  3.6   |  25  |  0.83    Ca    9.1      18 Mar 2023 05:57    TPro  7.9  /  Alb  4.3  /  TBili  0.4  /  DBili  x   /  AST  15  /  ALT  16  /  AlkPhos  76  03-17            CAPILLARY BLOOD GLUCOSE      POCT Blood Glucose.: 229 mg/dL (18 Mar 2023 21:47)  POCT Blood Glucose.: 145 mg/dL (18 Mar 2023 19:52)  POCT Blood Glucose.: 157 mg/dL (18 Mar 2023 18:20)  POCT Blood Glucose.: 144 mg/dL (18 Mar 2023 13:32)  POCT Blood Glucose.: 169 mg/dL (18 Mar 2023 08:51)        RADIOLOGY & ADDITIONAL TESTS:    Imaging Personally Reviewed:    Consultant(s) Notes Reviewed:      Care Discussed with Consultants/Other Providers:    Tim Justin MD, CMD, FACP    [address] [place] [address]  Office Tel: [phone]  Cell: [phone]

## 2023-03-18 NOTE — DISCHARGE NOTE PROVIDER - CARE PROVIDERS DIRECT ADDRESSES
[email or web address]

## 2023-03-18 NOTE — CHART NOTE - NSCHARTNOTEFT_GEN_A_CORE
-Will end SOC watch as repeat CT this AM 3/18 stable and unlikely to expand given this is post-symptom day 3. Exam remains intact.  -Remaining AC recs per neurology  -Please feel free to recontact neurosurgery if any concern

## 2023-03-18 NOTE — PROGRESS NOTE ADULT - ASSESSMENT
LeleBeth  47M hx of HTN/DM who initally presented to OSH with dizziness on 3/15 was discharged on meclizine due to neg CT/CTA. 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 Has mild compression against the 4th on CT. No hydro. Currently on ASA/Plavix. Exam: intact     -Will follow for SOC watch  -Should hold plavix but can continue ASA 81 until SOC watch period is over  -Repeat CT head this AM  -MRI/MRA demonstrates acute R medial cerebellar infarct. MRA suggestive of proximal VA dissection with reconstitution in V2  LeleBeth  47M hx of HTN/DM who initally presented to OSH with dizziness on 3/15 was discharged on meclizine due to neg CT/CTA. 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 Has mild compression against the 4th on CT. No hydro. Currently on ASA/Plavix. Exam: intact     -Will end SOC watch as repeat CT this AM 3/18 stable and unlikely to expand given this is post-symptom day 3. Exam remains intact.  -Remaining AC recs per neurology

## 2023-03-18 NOTE — DISCHARGE NOTE PROVIDER - NSDCMRMEDTOKEN_GEN_ALL_CORE_FT
bisoprolol-hydrochlorothiazide 5 mg-6.25 mg oral tablet: 1 tab(s) orally once a day   bisoprolol-hydrochlorothiazide 5 mg-6.25 mg oral tablet: 1 tab(s) orally once a day  glipiZIDE 5 mg oral tablet: 1 tab(s) orally once a day   alcohol swabs : Apply topically to affected area 4 times a day   aspirin 81 mg oral tablet, chewable: 1 tab(s) orally once a day  atorvastatin 80 mg oral tablet: 1 tab(s) orally once a day (at bedtime)  bisoprolol-hydrochlorothiazide 5 mg-6.25 mg oral tablet: 1 tab(s) orally once a day  glipiZIDE 5 mg oral tablet: 1 tab(s) orally once a day  glucometer (per patient&#x27;s insurance): Test blood sugars four times a day. Dispense #1 glucometer.  lancets: 1 application subcutaneously 4 times a day   Rolling Walker: Dx: Stroke  Shower Chair: Dx: Stroke  test strips (per patient&#x27;s insurance): 1 application subcutaneously 4 times a day. ** Compatible with patient&#x27;s glucometer **

## 2023-03-18 NOTE — DISCHARGE NOTE PROVIDER - NSFOLLOWUPCLINICS_GEN_ALL_ED_FT
[facility] Endocrinology  Endocrinology  [address] [place] [address]  Phone: [phone]  Fax:

## 2023-03-18 NOTE — PROGRESS NOTE ADULT - SUBJECTIVE AND OBJECTIVE BOX
THE PATIENT WAS SEEN AND EXAMINED BY ME WITH THE HOUSESTAFF AND STROKE TEAM DURING MORNING ROUNDS.   HPI:  HPI: Patient VINEET DUGAN is a 47y (1975) man with a PMHx significant for HTN and DM who presented to the ED for worsening Dizziness. Starting on 3/15 early morning pt woke up from sleep with sweating and room spinning dizziness which then only became symptomatic upon movement. Pt went to [facility] and received a CT, CTA which he says was negative, was discharged on meclizine. While at home, pt continued to be symptomatic upon ambulation and had one episode of vomiting, went to [facility] for [facility] evaluation. Pt denied any vision changes, numbness, weakness, slurred speech.   While in the ED, /100 other vss. NIHSS 0, MRS 0. 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      (17 Mar 2023 06:53)      SUBJECTIVE: No events overnight.  No new neurologic complaints.      acetaminophen     Tablet .. 650 milliGRAM(s) Oral every 6 hours PRN  aspirin  chewable 81 milliGRAM(s) Oral daily  atorvastatin 80 milliGRAM(s) Oral at bedtime  dextrose 5%. 1000 milliLiter(s) IV Continuous <Continuous>  dextrose 5%. 1000 milliLiter(s) IV Continuous <Continuous>  dextrose 50% Injectable 25 Gram(s) IV Push once  dextrose 50% Injectable 12.5 Gram(s) IV Push once  dextrose 50% Injectable 25 Gram(s) IV Push once  dextrose Oral Gel 15 Gram(s) Oral once PRN  enoxaparin Injectable 40 milliGRAM(s) SubCutaneous every 24 hours  glucagon  Injectable 1 milliGRAM(s) IntraMuscular once  insulin lispro (ADMELOG) corrective regimen sliding scale   SubCutaneous three times a day before meals  insulin lispro (ADMELOG) corrective regimen sliding scale   SubCutaneous at bedtime  ondansetron Injectable 4 milliGRAM(s) IV Push every 6 hours PRN      PHYSICAL EXAM:   Vital Signs Last 24 Hrs  T(C): 37.1 (18 Mar 2023 06:00), Max: 37.1 (18 Mar 2023 06:00)  T(F): 98.7 (18 Mar 2023 06:00), Max: 98.7 (18 Mar 2023 06:00)  HR: 59 (18 Mar 2023 06:00) (57 - 81)  BP: 126/78 (18 Mar 2023 06:00) (126/78 - 162/102)  BP(mean): 93 (18 Mar 2023 06:00) (93 - 121)  RR: 18 (18 Mar 2023 06:00) (18 - 20)  SpO2: 98% (18 Mar 2023 06:00) (97% - 100%)    Parameters below as of 18 Mar 2023 06:00  Patient On (Oxygen Delivery Method): room air        General: No acute distress  HEENT: EOM intact, visual fields full  Abdomen: Soft, nontender, nondistended   Extremities: No edema    NEUROLOGICAL EXAM:  Mental status: Awake, alert, oriented x3, no aphasia, no neglect, normal memory   Cranial Nerves: No facial asymmetry, no nystagmus, no dysarthria,  tongue midline  Motor exam: Normal tone, no drift, 5/5 RUE, 5/5 RLE, 5/5 LUE, 5/5 LLE, normal fine finger movements.  Sensation: Intact to light touch   Coordination/ Gait: No dysmetria, DAYANNA intact and symmetric bilaterally    LABS:                        13.3   8.81  )-----------( 218      ( 18 Mar 2023 05:57 )             40.9    03-18    137  |  100  |  13  ----------------------------<  151<H>  3.6   |  25  |  0.83    Ca    9.1      18 Mar 2023 05:57    TPro  7.9  /  Alb  4.3  /  TBili  0.4  /  DBili  x   /  AST  15  /  ALT  16  /  AlkPhos  76  03-17        IMAGING: Reviewed by me.      THE PATIENT WAS SEEN AND EXAMINED BY ME WITH THE HOUSESTAFF AND STROKE TEAM DURING MORNING ROUNDS.     HPI: 47y (1975) man with a PMHx significant for HTN and DM who presented to the ED for worsening Dizziness. Starting on 3/15 early morning pt woke up from sleep with sweating and room spinning dizziness which then only became symptomatic upon movement. Pt went to [facility] and received a CT, CTA which he says was negative, was discharged on meclizine. While at home, pt continued to be symptomatic upon ambulation and had one episode of vomiting, went to [facility] for further evaluation. Pt denied any vision changes, numbness, weakness, slurred speech.   While in the ED, /100 other vss. NIHSS 0, MRS 0. 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     SUBJECTIVE: Headache overnight s/P Tylenol     acetaminophen     Tablet .. 650 milliGRAM(s) Oral every 6 hours PRN  aspirin  chewable 81 milliGRAM(s) Oral daily  atorvastatin 80 milliGRAM(s) Oral at bedtime  dextrose 5%. 1000 milliLiter(s) IV Continuous <Continuous>  dextrose 5%. 1000 milliLiter(s) IV Continuous <Continuous>  dextrose 50% Injectable 25 Gram(s) IV Push once  dextrose 50% Injectable 12.5 Gram(s) IV Push once  dextrose 50% Injectable 25 Gram(s) IV Push once  dextrose Oral Gel 15 Gram(s) Oral once PRN  enoxaparin Injectable 40 milliGRAM(s) SubCutaneous every 24 hours  glucagon  Injectable 1 milliGRAM(s) IntraMuscular once  insulin lispro (ADMELOG) corrective regimen sliding scale   SubCutaneous three times a day before meals  insulin lispro (ADMELOG) corrective regimen sliding scale   SubCutaneous at bedtime  ondansetron Injectable 4 milliGRAM(s) IV Push every 6 hours PRN      PHYSICAL EXAM:   Vital Signs Last 24 Hrs  T(C): 37.1 (18 Mar 2023 06:00), Max: 37.1 (18 Mar 2023 06:00)  T(F): 98.7 (18 Mar 2023 06:00), Max: 98.7 (18 Mar 2023 06:00)  HR: 59 (18 Mar 2023 06:00) (57 - 81)  BP: 126/78 (18 Mar 2023 06:00) (126/78 - 162/102)  BP(mean): 93 (18 Mar 2023 06:00) (93 - 121)  RR: 18 (18 Mar 2023 06:00) (18 - 20)  SpO2: 98% (18 Mar 2023 06:00) (97% - 100%)    Parameters below as of 18 Mar 2023 06:00  Patient On (Oxygen Delivery Method): room air    General: No acute distress  HEENT: EOM intact, visual fields full  Abdomen: Soft, nontender, nondistended   Extremities: No edema    NEUROLOGICAL EXAM:  Mental status: Awake, alert, oriented x3, no aphasia, no neglect, normal memory   Cranial Nerves: No facial asymmetry, no nystagmus, no dysarthria,  tongue midline  Motor exam: Normal tone, no drift, 5/5 RUE, 5/5 RLE, 5/5 LUE, 5/5 LLE, normal fine finger movements.  Sensation: Intact to light touch   Coordination/ Gait: No dysmetria, DAYANNA intact and symmetric bilaterally    LABS:                        13.3   8.81  )-----------( 218      ( 18 Mar 2023 05:57 )             40.9    03-18    137  |  100  |  13  ----------------------------<  151<H>  3.6   |  25  |  0.83    Ca    9.1      18 Mar 2023 05:57    TPro  7.9  /  Alb  4.3  /  TBili  0.4  /  DBili  x   /  AST  15  /  ALT  16  /  AlkPhos  76  03-17        IMAGING: Reviewed by me.     MRI Head and MRA Head and Neck 3/17/23:   Acute large right medial cerebellar infarct. Nonvisualization   of the proximal right vertebral artery with some signal hyperintensity on   the T1 fat-sat images suggesting a proximal vertebral artery dissection   with reconstitution in the V2 portion with antegrade flow identified in   the V3 and v4 portions of the right vertebral artery.    CTA head and Neck and CT Head 3/17/23:   CT HEAD: Hypodense area within the right inferior cerebellum concerning   for acute infarction in the right PICA territory.    CTA BRAIN/NECK: Right vertebral dissection with occlusion of the right   PICA.    TTE 3/17/23:   Conclusions:  1. Increased relative wall thickness with normal left  ventricular mass index, consistent with concentric left  ventricular remodeling.  2. Endocardial visualization enhanced with intravenous  injection of Ultrasonic Enhancing Agent (Lumason). Normal  left ventricular systolic function. No segmental wall  motion abnormalities.  3. Normal right ventricular size and function.  4. Agitated saline injection demonstrates no evidence of a  patent foramen ovale.  *** No previous Echo exam.     THE PATIENT WAS SEEN AND EXAMINED BY ME WITH THE HOUSESTAFF AND STROKE TEAM DURING MORNING ROUNDS.     HPI: 47y (1975) man with a PMHx significant for HTN and DM who presented to the ED for worsening Dizziness. Starting on 3/15 early morning pt woke up from sleep with sweating and room spinning dizziness which then only became symptomatic upon movement. Pt went to [facility] and received a CT, CTA which he says was negative, was discharged on meclizine. While at home, pt continued to be symptomatic upon ambulation and had one episode of vomiting, went to [facility] for further evaluation. Pt denied any vision changes, numbness, weakness, slurred speech.   While in the ED, /100 other vss. NIHSS 0, MRS 0. 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     SUBJECTIVE: Headache overnight s/P Tylenol     acetaminophen     Tablet .. 650 milliGRAM(s) Oral every 6 hours PRN  aspirin  chewable 81 milliGRAM(s) Oral daily  atorvastatin 80 milliGRAM(s) Oral at bedtime  dextrose 5%. 1000 milliLiter(s) IV Continuous <Continuous>  dextrose 5%. 1000 milliLiter(s) IV Continuous <Continuous>  dextrose 50% Injectable 25 Gram(s) IV Push once  dextrose 50% Injectable 12.5 Gram(s) IV Push once  dextrose 50% Injectable 25 Gram(s) IV Push once  dextrose Oral Gel 15 Gram(s) Oral once PRN  enoxaparin Injectable 40 milliGRAM(s) SubCutaneous every 24 hours  glucagon  Injectable 1 milliGRAM(s) IntraMuscular once  insulin lispro (ADMELOG) corrective regimen sliding scale   SubCutaneous three times a day before meals  insulin lispro (ADMELOG) corrective regimen sliding scale   SubCutaneous at bedtime  ondansetron Injectable 4 milliGRAM(s) IV Push every 6 hours PRN      PHYSICAL EXAM:   Vital Signs Last 24 Hrs  T(C): 37.1 (18 Mar 2023 06:00), Max: 37.1 (18 Mar 2023 06:00)  T(F): 98.7 (18 Mar 2023 06:00), Max: 98.7 (18 Mar 2023 06:00)  HR: 59 (18 Mar 2023 06:00) (57 - 81)  BP: 126/78 (18 Mar 2023 06:00) (126/78 - 162/102)  BP(mean): 93 (18 Mar 2023 06:00) (93 - 121)  RR: 18 (18 Mar 2023 06:00) (18 - 20)  SpO2: 98% (18 Mar 2023 06:00) (97% - 100%)    Parameters below as of 18 Mar 2023 06:00  Patient On (Oxygen Delivery Method): room air    General: No acute distress  HEENT: EOM intact, visual fields full  Abdomen: Soft, nontender, nondistended   Extremities: No edema    NEUROLOGICAL EXAM:  Mental status: Awake, alert, oriented x3, no aphasia, no neglect, normal memory   Cranial Nerves: No facial asymmetry, no nystagmus, no dysarthria,  tongue midline  Motor exam: Normal tone, no drift, 5/5 RUE, 5/5 RLE, 5/5 LUE, 5/5 LLE, normal fine finger movements.  Sensation: Intact to light touch   Coordination/ Gait: No dysmetria. Gait not assessed     LABS:                        13.3   8.81  )-----------( 218      ( 18 Mar 2023 05:57 )             40.9    03-18    137  |  100  |  13  ----------------------------<  151<H>  3.6   |  25  |  0.83    Ca    9.1      18 Mar 2023 05:57    TPro  7.9  /  Alb  4.3  /  TBili  0.4  /  DBili  x   /  AST  15  /  ALT  16  /  AlkPhos  76  03-17      IMAGING: Reviewed by me.     MRI Head and MRA Head and Neck 3/17/23:   Acute large right medial cerebellar infarct. Nonvisualization   of the proximal right vertebral artery with some signal hyperintensity on   the T1 fat-sat images suggesting a proximal vertebral artery dissection   with reconstitution in the V2 portion with antegrade flow identified in   the V3 and v4 portions of the right vertebral artery.    CTA head and Neck and CT Head 3/17/23:   CT HEAD: Hypodense area within the right inferior cerebellum concerning   for acute infarction in the right PICA territory.    CTA BRAIN/NECK: Right vertebral dissection with occlusion of the right   PICA.    TTE 3/17/23:   Conclusions:  1. Increased relative wall thickness with normal left  ventricular mass index, consistent with concentric left  ventricular remodeling.  2. Endocardial visualization enhanced with intravenous  injection of Ultrasonic Enhancing Agent (Lumason). Normal  left ventricular systolic function. No segmental wall  motion abnormalities.  3. Normal right ventricular size and function.  4. Agitated saline injection demonstrates no evidence of a  patent foramen ovale.  *** No previous Echo exam.

## 2023-03-18 NOTE — PROGRESS NOTE ADULT - ASSESSMENT
7y (1975) man with a PMHx significant for HTN and DM who presented to the ED for worsening Dizziness. Starting on 3/15 early morning pt woke up from sleep with sweating and room spinning dizziness which then only became symptomatic upon movement. Pt went to [facility] and received a CT, CTA which he says was negative, was discharged on meclizine. While at home, pt continued to be symptomatic upon ambulation and had one episode of vomiting, went to [facility] for further evaluation. Pt denied any vision changes, numbness, weakness, slurred speech. While in the ED, /100 other vss. NIHSS 0, MRS 0. 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       Acute cerebellar infarct:    Stroke f/up noted  ASA/Lipitor  ECHO: [facility]  PT f/up

## 2023-03-18 NOTE — PROGRESS NOTE ADULT - ASSESSMENT
ASSESSMENT:     NEURO: Continue close monitoring for neurologic deterioration, permissive HTN, titrate statin to LDL goal less than 70, MRI Brain w/o, MRA Head w/o and Neck w/contrast. Physical therapy/OT/Speech eval/treatment.     ANTITHROMBOTIC THERAPY:     PULMONARY: CXR clear, protecting airway, saturating well     CARDIOVASCULAR: check TTE, cardiac monitoring                              SBP goal:     GASTROINTESTINAL:  dysphagia screen       Diet:     RENAL: BUN/Cr stable, good urine output      Na Goal: Greater than 135     Grimes:    HEMATOLOGY: H/H stable, Platelets normal      DVT ppx: Heparin s.c [] LMWH []     ID: afebrile, no leukocytosis     OTHER:     DISPOSITION: Rehab or home depending on PT eval once stable and workup is complete      CORE MEASURES:        Admission NIHSS:      TPA: [] YES [] NO      LDL/HDL:     Depression Screen:      Statin Therapy:     Dysphagia Screen: [] PASS [] FAIL     Smoking [] YES [] NO      Afib [] YES [] NO     Stroke Education [] YES [] NO    Obtain screening lower extremity venous ultrasound in patients who meet 1 or more of the following criteria as patient is high risk for DVT/PE on admission:   [] History of DVT/PE  []Hypercoagulable states (Factor V Leiden, Cancer, OCP, etc. )  []Prolonged immobility (hemiplegia/hemiparesis/post operative or any other extended immobilization)  [] Transferred from outside facility (Rehab or Long term care)  [] Age </= to [age]y (1975) man with a PMHx significant for HTN and DM who presented to the ED for worsening Dizziness. Starting on 3/15 early morning pt woke up from sleep with sweating and room spinning dizziness which then only became symptomatic upon movement. Pt went to [facility] and received a CT, CTA which he says was negative, was discharged on meclizine. While at home, pt continued to be symptomatic upon ambulation and had one episode of vomiting, went to [facility] for further evaluation. Pt denied any vision changes, numbness, weakness, slurred speech. While in the ED, /100 other vss. NIHSS 0, MRS 0. 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     Impression     NEURO: Continue close monitoring for neurologic deterioration, permissive HTN, titrate statin to LDL goal less than 70, MRI Brain w/o, MRA Head w/o and Neck w/contrast. Physical therapy/OT/Speech eval/treatment.     ANTITHROMBOTIC THERAPY: Continue ASA     PULMONARY: CXR clear, protecting airway, saturating well     CARDIOVASCULAR: TTE: EF: 60%. Continue cardiac monitoring                              SBP goal: Less than 160    GASTROINTESTINAL: Passed dysphagia screen       Diet: Regular    RENAL: BUN/Cr: 13/0.83, good urine output      Na Goal: Greater than 135     Grimes: NO    HEMATOLOGY: H/H stable, Platelets normal      DVT ppx: Lovenox 40 subq    ID: afebrile, no leukocytosis     OTHER: Discussed medical condition with patient and wife at bedside     DISPOSITION: Outpatient PT with rolling walker once stable and workup is complete      CORE MEASURES:        Admission NIHSS: 0     TPA: [] YES [x] NO      LDL/HDL: 114/150     Depression Screen: N/A     Statin Therapy: Yes     Dysphagia Screen: [x] PASS [] FAIL     Smoking [] YES [x] NO      Afib [] YES [x] NO     Stroke Education [x] YES [] NO    Obtain screening lower extremity venous ultrasound in patients who meet 1 or more of the following criteria as patient is high risk for DVT/PE on admission:   [] History of DVT/PE  [] Hypercoagulable states (Factor V Leiden, Cancer, OCP, etc. )  [] Prolonged immobility (hemiplegia/hemiparesis/post operative or any other extended immobilization)  [] Transferred from outside facility (Rehab or Long term care)  [] Age </= to [age]y (1975) man with a PMHx significant for HTN and DM who presented to the ED for worsening Dizziness. Starting on 3/15 early morning pt woke up from sleep with sweating and room spinning dizziness which then only became symptomatic upon movement. Pt went to [facility] and received a CT, CTA which he says was negative, was discharged on meclizine. While at home, pt continued to be symptomatic upon ambulation and had one episode of vomiting, went to [facility] for further evaluation. Pt denied any vision changes, numbness, weakness, slurred speech. While in the ED, /100 other vss. NIHSS 0, MRS 0. 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     Impression:     NEURO: Continue close monitoring for neurologic deterioration. ,  titrate statin to LDL goal less than 70, MRI Brain w/o, MRA Head w/o and Neck w/contrast as above. Physical therapy/OT recommended outpatient physical therapy with rolling walker.     ANTITHROMBOTIC THERAPY: Continue ASA     PULMONARY: CXR clear, protecting airway, saturating well     CARDIOVASCULAR: TTE: EF: 60%. Continue cardiac monitoring                              SBP goal: Less than 160    GASTROINTESTINAL: Passed dysphagia screen       Diet: Regular    RENAL: BUN/Cr: 13/0.83, good urine output      Na Goal: Greater than 135     Grimes: NO    HEMATOLOGY: H/H stable, Platelets normal      DVT ppx: Lovenox 40 subq    ID: afebrile, no leukocytosis     OTHER: Discussed medical condition with patient and wife at bedside     DISPOSITION: Outpatient PT with rolling walker once stable and workup is complete      CORE MEASURES:        Admission NIHSS: 0     TPA: [] YES [x] NO      LDL/HDL: 114/150     Depression Screen: N/A     Statin Therapy: Yes     Dysphagia Screen: [x] PASS [] FAIL     Smoking [] YES [x] NO      Afib [] YES [x] NO     Stroke Education [x] YES [] NO    Obtain screening lower extremity venous ultrasound in patients who meet 1 or more of the following criteria as patient is high risk for DVT/PE on admission:   [] History of DVT/PE  [] Hypercoagulable states (Factor V Leiden, Cancer, OCP, etc. )  [] Prolonged immobility (hemiplegia/hemiparesis/post operative or any other extended immobilization)  [] Transferred from outside facility (Rehab or Long term care)  [] Age </= to [age]y (1975) man with a PMHx significant for HTN and DM who presented to the ED for worsening Dizziness. Starting on 3/15 early morning pt woke up from sleep with sweating and room spinning dizziness which then only became symptomatic upon movement. Pt went to [facility] and received a CT, CTA which he says was negative, was discharged on meclizine. While at home, pt continued to be symptomatic upon ambulation and had one episode of vomiting, went to [facility] for further evaluation. Pt denied any vision changes, numbness, weakness, slurred speech. While in the ED, /100 other vss. NIHSS 0, MRS 0. 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 ,  titrate statin to LDL goal less than 70. Continue Lipitor 80 bedtime. MRI Brain w/o, MRA Head w/o and Neck w/contrast as above. Continue ASA 81mg daily life long. Physical therapy/OT recommended outpatient physical therapy with rolling walker. Repeat PT/OT assessment for Rehab.     ANTITHROMBOTIC THERAPY: Continue ASA     PULMONARY: CXR clear, protecting airway, saturating well     CARDIOVASCULAR: TTE: EF: 60%. Continue cardiac monitoring                              SBP goal: Less than 160    GASTROINTESTINAL: Passed dysphagia screen       Diet: Regular    RENAL: BUN/Cr: 13/0.83, good urine output      Na Goal: Greater than 135     Grimes: NO    HEMATOLOGY: H/H stable, Platelets normal      DVT ppx: Lovenox 40 subq    ID: afebrile, no leukocytosis     OTHER: Discussed medical condition with patient and wife at bedside     DISPOSITION: Outpatient PT with rolling walker once stable and workup is complete    CORE MEASURES:        Admission NIHSS: 0     TPA: [] YES [x] NO      LDL/HDL: 114/150     Depression Screen: N/A     Statin Therapy: Yes     Dysphagia Screen: [x] PASS [] FAIL     Smoking [] YES [x] NO      Afib [] YES [x] NO     Stroke Education [x] YES [] NO    Obtain screening lower extremity venous ultrasound in patients who meet 1 or more of the following criteria as patient is high risk for DVT/PE on admission:   [] History of DVT/PE  [] Hypercoagulable states (Factor V Leiden, Cancer, OCP, etc. )  [] Prolonged immobility (hemiplegia/hemiparesis/post operative or any other extended immobilization)  [] Transferred from outside facility (Rehab or Long term care)  [] Age </= to 50

## 2023-03-18 NOTE — DISCHARGE NOTE PROVIDER - HOSPITAL COURSE
47y (1975) man with a PMHx significant for HTN and DM who presented to the ED for worsening Dizziness. Starting on 3/15 early morning pt woke up from sleep with sweating and room spinning dizziness which then only became symptomatic upon movement. Pt went to [facility] and received a CT, CTA which he says was negative, was discharged on meclizine. While at home, pt continued to be symptomatic upon ambulation and had one episode of vomiting, went to [facility] for further evaluation. Pt denied any vision changes, numbness, weakness, slurred speech. While in the ED, /100 other vss. NIHSS 0, MRS 0. 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 Patient admitted to the stroke Unit. 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 Starting on 3/15 early morning pt woke up from sleep with sweating and room spinning dizziness which then only became symptomatic upon movement. Pt went to [facility] and received a CT, CTA which he says was negative, was discharged on meclizine. While at home, pt continued to be symptomatic upon ambulation and had one episode of vomiting, went to [facility] for further evaluation. Pt denied any vision changes, numbness, weakness, slurred speech. While in the ED, /100 other vss. NIHSS 0, MRS 0.     Imaging:  MRI Head and MRA Head and Neck 3/17/23:   Acute large right medial cerebellar infarct. Nonvisualization   of the proximal right vertebral artery with some signal hyperintensity on   the T1 fat-sat images suggesting a proximal vertebral artery dissection   with reconstitution in the V2 portion with antegrade flow identified in   the V3 and v4 portions of the right vertebral artery.    CTA head and Neck and CT Head 3/17/23:   CT HEAD: Hypodense area within the right inferior cerebellum concerning   for acute infarction in the right PICA territory.    CTA BRAIN/NECK: Right vertebral dissection with occlusion of the right   PICA.    TTE 3/17/23:   Conclusions:  1. Increased relative wall thickness with normal left  ventricular mass index, consistent with concentric left  ventricular remodeling.  2. Endocardial visualization enhanced with intravenous  injection of Ultrasonic Enhancing Agent (Lumason). Normal  left ventricular systolic function. No segmental wall  motion abnormalities.  3. Normal right ventricular size and function.  4. Agitated saline injection demonstrates no evidence of a  patent foramen ovale.  *** No previous Echo exam.      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 Patient stable for discharge.

## 2023-03-18 NOTE — DISCHARGE NOTE PROVIDER - NSDCCPCAREPLAN_GEN_ALL_CORE_FT
PRINCIPAL DISCHARGE DIAGNOSIS  Diagnosis: Vertebral artery dissection  Assessment and Plan of Treatment: Please follow up with neurologist in 1 week. The office will call you to schedule an appointment, if you do not hear from them please call [phone]. Continue taking medications as prescribed. If you were prescribed a statin for your cholesterol please make sure you have your liver enzymes checked with your primary care physician. Monitor your blood pressure. Reduce fat, cholesterol and salt in your diet. Increase intake of fruits and vegetables. Limit alcohol to minimum and do not smoke. You may be at risk for falling, make changes to your home to help you walk easier. Keep up to date on vaccinations.  If you experience any symptoms of facial drooping, slurred speech, arm or leg weakness, severe headache, vision changes or any worsening symptoms, notify provider immediately and return to ER.

## 2023-03-18 NOTE — PROGRESS NOTE ADULT - SUBJECTIVE AND OBJECTIVE BOX
Patient seen and examined at bedside.    --Anticoagulation--  aspirin  chewable 81 milliGRAM(s) Oral daily  enoxaparin Injectable 40 milliGRAM(s) SubCutaneous every 24 hours    T(C): 36.9 (03-17-23 @ 20:00), Max: 36.9 (03-17-23 @ 20:00)  HR: 57 (03-18-23 @ 04:00) (57 - 81)  BP: 142/96 (03-18-23 @ 04:00) (135/102 - 173/96)  RR: 18 (03-18-23 @ 04:00) (18 - 20)  SpO2: 97% (03-18-23 @ 04:00) (97% - 100%)  Wt(kg): --    Exam:    Patient seen and examined at bedside.    --Anticoagulation--  aspirin  chewable 81 milliGRAM(s) Oral daily  enoxaparin Injectable 40 milliGRAM(s) SubCutaneous every 24 hours    T(C): 36.9 (03-17-23 @ 20:00), Max: 36.9 (03-17-23 @ 20:00)  HR: 57 (03-18-23 @ 04:00) (57 - 81)  BP: 142/96 (03-18-23 @ 04:00) (135/102 - 173/96)  RR: 18 (03-18-23 @ 04:00) (18 - 20)  SpO2: 97% (03-18-23 @ 04:00) (97% - 100%)  Wt(kg): --    Exam: AO3, PERRL, EOMI, no droop/drift, HALEY 5/5, SILT

## 2023-03-18 NOTE — DISCHARGE NOTE PROVIDER - CARE PROVIDER_API CALL
Tim Justin)  Internal Medicine  [address] [facility], [address] [place] [address]  Phone: [phone]  Fax: [phone]  Follow Up Time:     Marcia Rouse (NP; RN; DNP)  NP in [address] [address]  Phone: [phone]  Fax: [phone]  Follow Up Time:

## 2023-03-19 LAB
ANION GAP SERPL CALC-SCNC: 13 MMOL/L — SIGNIFICANT CHANGE UP (ref 5–17)
AUTO DIFF PNL BLD: NEGATIVE — SIGNIFICANT CHANGE UP
BUN SERPL-MCNC: 14 MG/DL — SIGNIFICANT CHANGE UP (ref 7–23)
C-ANCA SER-ACNC: NEGATIVE — SIGNIFICANT CHANGE UP
CALCIUM SERPL-MCNC: 9.2 MG/DL — SIGNIFICANT CHANGE UP (ref 8.4–10.5)
CHLORIDE SERPL-SCNC: 98 MMOL/L — SIGNIFICANT CHANGE UP (ref 96–108)
CO2 SERPL-SCNC: 24 MMOL/L — SIGNIFICANT CHANGE UP (ref 22–31)
CREAT SERPL-MCNC: 0.83 MG/DL — SIGNIFICANT CHANGE UP (ref 0.5–1.3)
DRVVT RATIO: 1.06 RATIO — SIGNIFICANT CHANGE UP (ref 0–1.21)
DRVVT SCREEN TO CONFIRM RATIO: SIGNIFICANT CHANGE UP
EGFR: 107 ML/MIN/1.73M2 — SIGNIFICANT CHANGE UP
GLUCOSE SERPL-MCNC: 146 MG/DL — HIGH (ref 70–99)
HCT VFR BLD CALC: 42.6 % — SIGNIFICANT CHANGE UP (ref 39–50)
HGB BLD-MCNC: 14 G/DL — SIGNIFICANT CHANGE UP (ref 13–17)
MCHC RBC-ENTMCNC: 27 PG — SIGNIFICANT CHANGE UP (ref 27–34)
MCHC RBC-ENTMCNC: 32.9 GM/DL — SIGNIFICANT CHANGE UP (ref 32–36)
MCV RBC AUTO: 82.1 FL — SIGNIFICANT CHANGE UP (ref 80–100)
NORMALIZED SCT PPP-RTO: 1.17 RATIO — HIGH (ref 0–1.16)
NORMALIZED SCT PPP-RTO: ABNORMAL
NRBC # BLD: 0 /100 WBCS — SIGNIFICANT CHANGE UP (ref 0–0)
P-ANCA SER-ACNC: NEGATIVE — SIGNIFICANT CHANGE UP
PLATELET # BLD AUTO: 239 K/UL — SIGNIFICANT CHANGE UP (ref 150–400)
POTASSIUM SERPL-MCNC: 3.5 MMOL/L — SIGNIFICANT CHANGE UP (ref 3.5–5.3)
POTASSIUM SERPL-SCNC: 3.5 MMOL/L — SIGNIFICANT CHANGE UP (ref 3.5–5.3)
RBC # BLD: 5.19 M/UL — SIGNIFICANT CHANGE UP (ref 4.2–5.8)
RBC # FLD: 12 % — SIGNIFICANT CHANGE UP (ref 10.3–14.5)
SMOOTH MUSCLE AB SER-ACNC: ABNORMAL
SODIUM SERPL-SCNC: 135 MMOL/L — SIGNIFICANT CHANGE UP (ref 135–145)
WBC # BLD: 9.3 K/UL — SIGNIFICANT CHANGE UP (ref 3.8–10.5)
WBC # FLD AUTO: 9.3 K/UL — SIGNIFICANT CHANGE UP (ref 3.8–10.5)

## 2023-03-19 PROCEDURE — 99233 SBSQ HOSP IP/OBS HIGH 50: CPT

## 2023-03-19 RX ADMIN — Medication 81 MILLIGRAM(S): at 11:37

## 2023-03-19 RX ADMIN — Medication 650 MILLIGRAM(S): at 05:14

## 2023-03-19 RX ADMIN — ENOXAPARIN SODIUM 40 MILLIGRAM(S): 100 INJECTION SUBCUTANEOUS at 22:21

## 2023-03-19 RX ADMIN — Medication 1: at 22:21

## 2023-03-19 RX ADMIN — Medication 3: at 17:00

## 2023-03-19 RX ADMIN — Medication 650 MILLIGRAM(S): at 04:44

## 2023-03-19 RX ADMIN — ATORVASTATIN CALCIUM 80 MILLIGRAM(S): 80 TABLET, FILM COATED ORAL at 22:20

## 2023-03-19 RX ADMIN — Medication 1: at 09:15

## 2023-03-19 RX ADMIN — Medication 2: at 12:51

## 2023-03-19 NOTE — PROGRESS NOTE ADULT - ASSESSMENT
7y (1975) man with a PMHx significant for HTN and DM who presented to the ED for worsening Dizziness. Starting on 3/15 early morning pt woke up from sleep with sweating and room spinning dizziness which then only became symptomatic upon movement. Pt went to [facility] and received a CT, CTA which he says was negative, was discharged on meclizine. While at home, pt continued to be symptomatic upon ambulation and had one episode of vomiting, went to [facility] for further evaluation. Pt denied any vision changes, numbness, weakness, slurred speech. While in the ED, /100 other vss. NIHSS 0, MRS 0. 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       Acute cerebellar infarct:    Stroke f/up noted  ASA/Lipitor  ECHO: [facility]  PT f/up

## 2023-03-19 NOTE — DIETITIAN INITIAL EVALUATION ADULT - NS FNS DIET ORDER
Diet, Regular:   Consistent Carbohydrate {Evening Snack} (CSTCHOSN)  Vegan {Accepts Vegetable Products Only} (03-17-23 @ 14:44) [Active]

## 2023-03-19 NOTE — DIETITIAN INITIAL EVALUATION ADULT - NSFNSGIIOFT_GEN_A_CORE
Denies nausea, vomiting, constipation, diarrhea. Reports last BM 3/19. Pt not currently on bowel regimen.

## 2023-03-19 NOTE — DIETITIAN INITIAL EVALUATION ADULT - REASON FOR ADMISSION
Vertebral artery dissection    Per chart: 47y (1975) man with a PMHx significant for HTN and DM who presented to the ED for worsening Dizziness. 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

## 2023-03-19 NOTE — PROGRESS NOTE ADULT - SUBJECTIVE AND OBJECTIVE BOX
Patient is a 47y old  Male who presents with a chief complaint of Vertebral dissection (18 Mar 2023 13:39)      SUBJECTIVE / OVERNIGHT EVENTS:    Events noted.  CONSTITUTIONAL: No fever,  or fatigue  RESPIRATORY: No cough, wheezing,  No shortness of breath  CARDIOVASCULAR: No chest pain, palpitations, dizziness, or leg swelling  GASTROINTESTINAL: No abdominal or epigastric pain. No nausea, vomiting.  NEUROLOGICAL: No headache    MEDICATIONS  (STANDING):  aspirin  chewable 81 milliGRAM(s) Oral daily  atorvastatin 80 milliGRAM(s) Oral at bedtime  dextrose 5%. 1000 milliLiter(s) (50 mL/Hr) IV Continuous <Continuous>  dextrose 5%. 1000 milliLiter(s) (100 mL/Hr) IV Continuous <Continuous>  dextrose 50% Injectable 25 Gram(s) IV Push once  dextrose 50% Injectable 12.5 Gram(s) IV Push once  dextrose 50% Injectable 25 Gram(s) IV Push once  enoxaparin Injectable 40 milliGRAM(s) SubCutaneous every 24 hours  glucagon  Injectable 1 milliGRAM(s) IntraMuscular once  insulin lispro (ADMELOG) corrective regimen sliding scale   SubCutaneous three times a day before meals  insulin lispro (ADMELOG) corrective regimen sliding scale   SubCutaneous at bedtime    MEDICATIONS  (PRN):  acetaminophen     Tablet .. 650 milliGRAM(s) Oral every 6 hours PRN Mild Pain (1 - 3), Moderate Pain (4 - 6)  dextrose Oral Gel 15 Gram(s) Oral once PRN Blood Glucose LESS THAN 70 milliGRAM(s)/deciliter  ondansetron Injectable 4 milliGRAM(s) IV Push every 6 hours PRN Nausea and/or Vomiting        CAPILLARY BLOOD GLUCOSE      POCT Blood Glucose.: 229 mg/dL (18 Mar 2023 21:47)  POCT Blood Glucose.: 145 mg/dL (18 Mar 2023 19:52)  POCT Blood Glucose.: 157 mg/dL (18 Mar 2023 18:20)  POCT Blood Glucose.: 144 mg/dL (18 Mar 2023 13:32)  POCT Blood Glucose.: 169 mg/dL (18 Mar 2023 08:51)    I&O's Summary    17 Mar 2023 07:01  -  18 Mar 2023 07:00  --------------------------------------------------------  IN: 240 mL / OUT: 300 mL / NET: -60 mL    18 Mar 2023 07:01  -  19 Mar 2023 00:18  --------------------------------------------------------  IN: 840 mL / OUT: 0 mL / NET: 840 mL        T(C): 36.9 (03-18-23 @ 20:00), Max: 37.1 (03-18-23 @ 06:00)  HR: 70 (03-19-23 @ 00:00) (57 - 81)  BP: 134/96 (03-19-23 @ 00:00) (126/78 - 168/90)  RR: 17 (03-19-23 @ 00:00) (15 - 20)  SpO2: 98% (03-19-23 @ 00:00) (97% - 99%)    PHYSICAL EXAM:  GENERAL: NAD  NECK: Supple, No JVD  CHEST/LUNG: Clear to auscultation bilaterally; No wheezing.  HEART: Regular rate and rhythm; No murmurs, rubs, or gallops  ABDOMEN: Soft, Nontender, Nondistended; Bowel sounds present  EXTREMITIES:   No edema  NEUROLOGY: AAO X 3      LABS:                        13.3   8.81  )-----------( 218      ( 18 Mar 2023 05:57 )             40.9     03-18    137  |  100  |  13  ----------------------------<  151<H>  3.6   |  25  |  0.83    Ca    9.1      18 Mar 2023 05:57    TPro  7.9  /  Alb  4.3  /  TBili  0.4  /  DBili  x   /  AST  15  /  ALT  16  /  AlkPhos  76  03-17            CAPILLARY BLOOD GLUCOSE      POCT Blood Glucose.: 229 mg/dL (18 Mar 2023 21:47)  POCT Blood Glucose.: 145 mg/dL (18 Mar 2023 19:52)  POCT Blood Glucose.: 157 mg/dL (18 Mar 2023 18:20)  POCT Blood Glucose.: 144 mg/dL (18 Mar 2023 13:32)  POCT Blood Glucose.: 169 mg/dL (18 Mar 2023 08:51)        RADIOLOGY & ADDITIONAL TESTS:    Imaging Personally Reviewed:    Consultant(s) Notes Reviewed:      Care Discussed with Consultants/Other Providers:    Tim Justin MD, CMD, FACP    [address] [place] [address]  Office Tel: [phone]  Cell: [phone]

## 2023-03-19 NOTE — DIETITIAN INITIAL EVALUATION ADULT - OTHER INFO
Reports UBW was 195 lbs 1 year ago. Endorses wt loss to 175-180 lbs but does not know reason for weight loss, denies changes in diet or physical activity levels.  Dosing wt: 184.9 lbs (03-16, stated)  No additional weights noted per chart. Based on pt report, would indicate 8-10% wt loss x 1 year - not clinically significant. RD to continue to monitor weight trends as able/available.     Per chart, pt currently ordered for admelog ISS, atorvastatin, and zofran in-house.

## 2023-03-19 NOTE — DIETITIAN INITIAL EVALUATION ADULT - ADD RECOMMEND
1) Continue Consistent Carbohydrate, Vegan diet.  2) Monitor PO intake, GI tolerance, skin integrity, labs, weight, and bowel movement regularity.   3) Honor food preferences as feasible. Assist with meals PRN and encourage PO intake.  4) RD remains available upon request and will follow-up per protocol.

## 2023-03-19 NOTE — DIETITIAN INITIAL EVALUATION ADULT - ENERGY INTAKE
Pt reports good PO intake in-house, food preferences provided pt and wife and to be honored as able.

## 2023-03-19 NOTE — DIETITIAN INITIAL EVALUATION ADULT - PERSON TAUGHT/METHOD
Encouraged continued good PO intake of meals to meet estimated needs. Obtained food preferences to further promote good PO intake. Reviewed diet for DM, discussing briefly sources of carbohydrates, benefit of pairing carbohydrate rich foods with protein for optimal glycemic control, choosing whole grains vs refined, limited concentrated sweets, and including fibrous foods in the diet. All questions answered, made aware RD remains available for any additional questions/concerns./verbal instruction/patient instructed/spouse instructed

## 2023-03-19 NOTE — DIETITIAN INITIAL EVALUATION ADULT - PERTINENT MEDS FT
MEDICATIONS  (STANDING):  aspirin  chewable 81 milliGRAM(s) Oral daily  atorvastatin 80 milliGRAM(s) Oral at bedtime  dextrose 5%. 1000 milliLiter(s) (50 mL/Hr) IV Continuous <Continuous>  dextrose 5%. 1000 milliLiter(s) (100 mL/Hr) IV Continuous <Continuous>  dextrose 50% Injectable 25 Gram(s) IV Push once  dextrose 50% Injectable 12.5 Gram(s) IV Push once  dextrose 50% Injectable 25 Gram(s) IV Push once  enoxaparin Injectable 40 milliGRAM(s) SubCutaneous every 24 hours  glucagon  Injectable 1 milliGRAM(s) IntraMuscular once  insulin lispro (ADMELOG) corrective regimen sliding scale   SubCutaneous three times a day before meals  insulin lispro (ADMELOG) corrective regimen sliding scale   SubCutaneous at bedtime    MEDICATIONS  (PRN):  acetaminophen     Tablet .. 650 milliGRAM(s) Oral every 6 hours PRN Mild Pain (1 - 3), Moderate Pain (4 - 6)  dextrose Oral Gel 15 Gram(s) Oral once PRN Blood Glucose LESS THAN 70 milliGRAM(s)/deciliter  ondansetron Injectable 4 milliGRAM(s) IV Push every 6 hours PRN Nausea and/or Vomiting

## 2023-03-19 NOTE — DIETITIAN INITIAL EVALUATION ADULT - NSFNSADHERENCEPTAFT_GEN_A_CORE
Pt noted with hx of DM2. Pt denies ever taking any DM medications or checking fingersticks PTA. A1c 7.4% indicates fair glycemic control. Pt states it used ot be 5.4% and before that it was ~10% at one point.

## 2023-03-19 NOTE — DIETITIAN INITIAL EVALUATION ADULT - REASON INDICATOR FOR ASSESSMENT
RD consult for Significant Decrease of Oral Intake >3 Days PTA.  Source: pt, pt's spouse at bedside, medical record. Chart reviewed, events noted.

## 2023-03-19 NOTE — PROGRESS NOTE ADULT - SUBJECTIVE AND OBJECTIVE BOX
THE PATIENT WAS SEEN AND EXAMINED BY ME WITH THE HOUSESTAFF AND STROKE TEAM DURING MORNING ROUNDS.   HPI:  HPI: Patient VINEET DUGAN is a 47y (1975) man with a PMHx significant for HTN and DM who presented to the ED for worsening Dizziness. Starting on 3/15 early morning pt woke up from sleep with sweating and room spinning dizziness which then only became symptomatic upon movement. Pt went to [facility] and received a CT, CTA which he says was negative, was discharged on meclizine. While at home, pt continued to be symptomatic upon ambulation and had one episode of vomiting, went to [facility] for further evaluation. Pt denied any vision changes, numbness, weakness, slurred speech.   While in the ED, /100 other vss. NIHSS 0, MRS 0. 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      (17 Mar 2023 06:53)      SUBJECTIVE: No events overnight.  No new neurologic complaints.      acetaminophen     Tablet .. 650 milliGRAM(s) Oral every 6 hours PRN  aspirin  chewable 81 milliGRAM(s) Oral daily  atorvastatin 80 milliGRAM(s) Oral at bedtime  dextrose 5%. 1000 milliLiter(s) IV Continuous <Continuous>  dextrose 5%. 1000 milliLiter(s) IV Continuous <Continuous>  dextrose 50% Injectable 25 Gram(s) IV Push once  dextrose 50% Injectable 12.5 Gram(s) IV Push once  dextrose 50% Injectable 25 Gram(s) IV Push once  dextrose Oral Gel 15 Gram(s) Oral once PRN  enoxaparin Injectable 40 milliGRAM(s) SubCutaneous every 24 hours  glucagon  Injectable 1 milliGRAM(s) IntraMuscular once  insulin lispro (ADMELOG) corrective regimen sliding scale   SubCutaneous three times a day before meals  insulin lispro (ADMELOG) corrective regimen sliding scale   SubCutaneous at bedtime  ondansetron Injectable 4 milliGRAM(s) IV Push every 6 hours PRN      PHYSICAL EXAM:   Vital Signs Last 24 Hrs  T(C): 36.7 (19 Mar 2023 06:00), Max: 37.1 (18 Mar 2023 14:00)  T(F): 98.1 (19 Mar 2023 06:00), Max: 98.8 (18 Mar 2023 14:00)  HR: 72 (19 Mar 2023 06:00) (58 - 74)  BP: 137/99 (19 Mar 2023 06:00) (134/96 - 168/90)  BP(mean): 112 (19 Mar 2023 06:00) (106 - 122)  RR: 16 (19 Mar 2023 06:00) (15 - 20)  SpO2: 97% (19 Mar 2023 06:00) (94% - 99%)    Parameters below as of 19 Mar 2023 06:00  Patient On (Oxygen Delivery Method): room air        General: No acute distress  HEENT: EOM intact, visual fields full  Abdomen: Soft, nontender, nondistended   Extremities: No edema    NEUROLOGICAL EXAM:  Mental status: Awake, alert, oriented x3, no aphasia, no neglect, normal memory   Cranial Nerves: No facial asymmetry, no nystagmus, no dysarthria,  tongue midline  Motor exam: Normal tone, no drift, 5/5 RUE, 5/5 RLE, 5/5 LUE, 5/5 LLE, normal fine finger movements.  Sensation: Intact to light touch   Coordination/ Gait: No dysmetria, DAYANNA intact and symmetric bilaterally    LABS:                        14.0   9.30  )-----------( 239      ( 19 Mar 2023 05:18 )             42.6    03-19    135  |  98  |  14  ----------------------------<  146<H>  3.5   |  24  |  0.83    Ca    9.2      19 Mar 2023 05:17          IMAGING: Reviewed by me.      THE PATIENT WAS SEEN AND EXAMINED BY ME WITH THE HOUSESTAFF AND STROKE TEAM DURING MORNING ROUNDS.     HPI: 47y (1975) man with a PMHx significant for HTN and DM who presented to the ED for worsening Dizziness. Starting on 3/15 early morning pt woke up from sleep with sweating and room spinning dizziness which then only became symptomatic upon movement. Pt went to [facility] and received a CT, CTA which he says was negative, was discharged on meclizine. While at home, pt continued to be symptomatic upon ambulation and had one episode of vomiting, went to [facility] for further evaluation. Pt denied any vision changes, numbness, weakness, slurred speech.   While in the ED, /100 other vss. NIHSS 0, MRS 0. 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     SUBJECTIVE: No events overnight.  No new neurologic complaints.      acetaminophen     Tablet .. 650 milliGRAM(s) Oral every 6 hours PRN  aspirin  chewable 81 milliGRAM(s) Oral daily  atorvastatin 80 milliGRAM(s) Oral at bedtime  dextrose 5%. 1000 milliLiter(s) IV Continuous <Continuous>  dextrose 5%. 1000 milliLiter(s) IV Continuous <Continuous>  dextrose 50% Injectable 25 Gram(s) IV Push once  dextrose 50% Injectable 12.5 Gram(s) IV Push once  dextrose 50% Injectable 25 Gram(s) IV Push once  dextrose Oral Gel 15 Gram(s) Oral once PRN  enoxaparin Injectable 40 milliGRAM(s) SubCutaneous every 24 hours  glucagon  Injectable 1 milliGRAM(s) IntraMuscular once  insulin lispro (ADMELOG) corrective regimen sliding scale   SubCutaneous three times a day before meals  insulin lispro (ADMELOG) corrective regimen sliding scale   SubCutaneous at bedtime  ondansetron Injectable 4 milliGRAM(s) IV Push every 6 hours PRN    PHYSICAL EXAM:   Vital Signs Last 24 Hrs  T(C): 36.7 (19 Mar 2023 06:00), Max: 37.1 (18 Mar 2023 14:00)  T(F): 98.1 (19 Mar 2023 06:00), Max: 98.8 (18 Mar 2023 14:00)  HR: 72 (19 Mar 2023 06:00) (58 - 74)  BP: 137/99 (19 Mar 2023 06:00) (134/96 - 168/90)  BP(mean): 112 (19 Mar 2023 06:00) (106 - 122)  RR: 16 (19 Mar 2023 06:00) (15 - 20)  SpO2: 97% (19 Mar 2023 06:00) (94% - 99%)    Parameters below as of 19 Mar 2023 06:00  Patient On (Oxygen Delivery Method): room air    General: No acute distress  HEENT: EOM intact, visual fields full  Abdomen: Soft, nontender, nondistended   Extremities: No edema    NEUROLOGICAL EXAM:  Mental status: Awake, alert, oriented x3, no aphasia, no neglect, normal memory   Cranial Nerves: No facial asymmetry, no nystagmus, no dysarthria,  tongue midline  Motor exam: Normal tone, no drift, 5/5 RUE, 5/5 RLE, 5/5 LUE, 5/5 LLE, normal fine finger movements.  Sensation: Intact to light touch   Coordination/ Gait: No dysmetria. Gait not assessed       LABS:                        14.0   9.30  )-----------( 239      ( 19 Mar 2023 05:18 )             42.6    03-19    135  |  98  |  14  ----------------------------<  146<H>  3.5   |  24  |  0.83    Ca    9.2      19 Mar 2023 05:17    IMAGING: Reviewed by me.     MRI Head and MRA Head and Neck 3/17/23:   Acute large right medial cerebellar infarct. Nonvisualization   of the proximal right vertebral artery with some signal hyperintensity on   the T1 fat-sat images suggesting a proximal vertebral artery dissection   with reconstitution in the V2 portion with antegrade flow identified in   the V3 and v4 portions of the right vertebral artery.    CTA head and Neck and CT Head 3/17/23:   CT HEAD: Hypodense area within the right inferior cerebellum concerning   for acute infarction in the right PICA territory.    CTA BRAIN/NECK: Right vertebral dissection with occlusion of the right   PICA.    TTE 3/17/23:   Conclusions:  1. Increased relative wall thickness with normal left  ventricular mass index, consistent with concentric left  ventricular remodeling.  2. Endocardial visualization enhanced with intravenous  injection of Ultrasonic Enhancing Agent (Lumason). Normal  left ventricular systolic function. No segmental wall  motion abnormalities.  3. Normal right ventricular size and function.  4. Agitated saline injection demonstrates no evidence of a  patent foramen ovale.  *** No previous Echo exam.       THE PATIENT WAS SEEN AND EXAMINED BY ME WITH THE HOUSESTAFF AND STROKE TEAM DURING MORNING ROUNDS.     HPI: 47y (1975) man with a PMHx significant for HTN and DM who presented to the ED for worsening Dizziness. Starting on 3/15 early morning pt woke up from sleep with sweating and room spinning dizziness which then only became symptomatic upon movement. Pt went to [facility] and received a CT, CTA which he says was negative, was discharged on meclizine. While at home, pt continued to be symptomatic upon ambulation and had one episode of vomiting, went to [facility] for further evaluation. Pt denied any vision changes, numbness, weakness, slurred speech.   While in the ED, /100 other vss. NIHSS 0, MRS 0. 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     SUBJECTIVE: No events overnight.  No new neurologic complaints.      acetaminophen     Tablet .. 650 milliGRAM(s) Oral every 6 hours PRN  aspirin  chewable 81 milliGRAM(s) Oral daily  atorvastatin 80 milliGRAM(s) Oral at bedtime  dextrose 5%. 1000 milliLiter(s) IV Continuous <Continuous>  dextrose 5%. 1000 milliLiter(s) IV Continuous <Continuous>  dextrose 50% Injectable 25 Gram(s) IV Push once  dextrose 50% Injectable 12.5 Gram(s) IV Push once  dextrose 50% Injectable 25 Gram(s) IV Push once  dextrose Oral Gel 15 Gram(s) Oral once PRN  enoxaparin Injectable 40 milliGRAM(s) SubCutaneous every 24 hours  glucagon  Injectable 1 milliGRAM(s) IntraMuscular once  insulin lispro (ADMELOG) corrective regimen sliding scale   SubCutaneous three times a day before meals  insulin lispro (ADMELOG) corrective regimen sliding scale   SubCutaneous at bedtime  ondansetron Injectable 4 milliGRAM(s) IV Push every 6 hours PRN    PHYSICAL EXAM:   Vital Signs Last 24 Hrs  T(C): 36.7 (19 Mar 2023 06:00), Max: 37.1 (18 Mar 2023 14:00)  T(F): 98.1 (19 Mar 2023 06:00), Max: 98.8 (18 Mar 2023 14:00)  HR: 72 (19 Mar 2023 06:00) (58 - 74)  BP: 137/99 (19 Mar 2023 06:00) (134/96 - 168/90)  BP(mean): 112 (19 Mar 2023 06:00) (106 - 122)  RR: 16 (19 Mar 2023 06:00) (15 - 20)  SpO2: 97% (19 Mar 2023 06:00) (94% - 99%)    Parameters below as of 19 Mar 2023 06:00  Patient On (Oxygen Delivery Method): room air    General: No acute distress  HEENT: EOM intact, visual fields full  Abdomen: Soft, nontender, nondistended   Extremities: No edema    NEUROLOGICAL EXAM:  Mental status: Awake, alert, oriented x3, no aphasia, no neglect, normal memory   Cranial Nerves: No facial asymmetry, no nystagmus, no dysarthria,  tongue midline  Motor exam: Left sided head tilt. Normal tone, no drift, 5/5 RUE, 5/5 RLE, 5/5 LUE, 5/5 LLE, normal fine finger movements.   Sensation: Intact to light touch   Coordination/ Gait: No dysmetria. Gait not assessed       LABS:                        14.0   9.30  )-----------( 239      ( 19 Mar 2023 05:18 )             42.6    03-19    135  |  98  |  14  ----------------------------<  146<H>  3.5   |  24  |  0.83    Ca    9.2      19 Mar 2023 05:17    IMAGING: Reviewed by me.     MRI Head and MRA Head and Neck 3/17/23:   Acute large right medial cerebellar infarct. Nonvisualization   of the proximal right vertebral artery with some signal hyperintensity on   the T1 fat-sat images suggesting a proximal vertebral artery dissection   with reconstitution in the V2 portion with antegrade flow identified in   the V3 and v4 portions of the right vertebral artery.    CTA head and Neck and CT Head 3/17/23:   CT HEAD: Hypodense area within the right inferior cerebellum concerning   for acute infarction in the right PICA territory.    CTA BRAIN/NECK: Right vertebral dissection with occlusion of the right   PICA.    TTE 3/17/23:   Conclusions:  1. Increased relative wall thickness with normal left  ventricular mass index, consistent with concentric left  ventricular remodeling.  2. Endocardial visualization enhanced with intravenous  injection of Ultrasonic Enhancing Agent (Lumason). Normal  left ventricular systolic function. No segmental wall  motion abnormalities.  3. Normal right ventricular size and function.  4. Agitated saline injection demonstrates no evidence of a  patent foramen ovale.  *** No previous Echo exam.

## 2023-03-19 NOTE — DIETITIAN INITIAL EVALUATION ADULT - PERTINENT LABORATORY DATA
03-19    135  |  98  |  14  ----------------------------<  146<H>  3.5   |  24  |  0.83    Ca    9.2      19 Mar 2023 05:17    CAPILLARY BLOOD GLUCOSE  POCT Blood Glucose.: 175 mg/dL (19 Mar 2023 09:09)  POCT Blood Glucose.: 229 mg/dL (18 Mar 2023 21:47)  POCT Blood Glucose.: 145 mg/dL (18 Mar 2023 19:52)  POCT Blood Glucose.: 157 mg/dL (18 Mar 2023 18:20)  POCT Blood Glucose.: 144 mg/dL (18 Mar 2023 13:32)    A1C with Estimated Average Glucose Result: 7.4 % (03-17-23 @ 07:57)

## 2023-03-19 NOTE — DIETITIAN INITIAL EVALUATION ADULT - ORAL INTAKE PTA/DIET HISTORY
Pt and wife report pt with good appetite/PO intake PTA, was following a vegan diet PTA.   Confirms NKFA.

## 2023-03-19 NOTE — PROGRESS NOTE ADULT - ASSESSMENT
46 YO (1975) man with a PMHx significant for HTN and DM who presented to the ED for worsening Dizziness. Starting on 3/15 early morning pt woke up from sleep with sweating and room spinning dizziness which then only became symptomatic upon movement. Pt went to [facility] and received a CT, CTA which he says was negative, was discharged on meclizine. While at home, pt continued to be symptomatic upon ambulation and had one episode of vomiting, went to [facility] for further evaluation. Pt denied any vision changes, numbness, weakness, slurred speech. While in the ED, /100 other vss. NIHSS 0, MRS 0. 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 ,  titrate statin to LDL goal less than 70. Continue Lipitor 80 bedtime. MRI Brain w/o, MRA Head w/o and Neck w/contrast as above. Continue ASA 81mg daily life long. Physical therapy/OT recommended outpatient physical therapy with rolling walker. Repeat PT/OT assessment for Rehab.     ANTITHROMBOTIC THERAPY: Continue ASA     PULMONARY: CXR clear, protecting airway, saturating well     CARDIOVASCULAR: TTE: EF: 60%. Continue cardiac monitoring                              SBP goal: Less than 160    GASTROINTESTINAL: Passed dysphagia screen       Diet: Regular    RENAL: BUN/Cr: 13/0.83, good urine output      Na Goal: Greater than 135     Grimes: NO    HEMATOLOGY: H/H stable, Platelets normal      DVT ppx: Lovenox 40 subq    ID: afebrile, no leukocytosis     OTHER: Discussed medical condition with patient and wife at bedside     DISPOSITION: Outpatient PT with rolling walker once stable and workup is complete    CORE MEASURES:        Admission NIHSS: 0     TPA: [] YES [x] NO      LDL/HDL: 114/150     Depression Screen: N/A     Statin Therapy: Yes     Dysphagia Screen: [x] PASS [] FAIL     Smoking [] YES [x] NO      Afib [] YES [x] NO     Stroke Education [x] YES [] NO    Obtain screening lower extremity venous ultrasound in patients who meet 1 or more of the following criteria as patient is high risk for DVT/PE on admission:   [] History of DVT/PE  [] Hypercoagulable states (Factor V Leiden, Cancer, OCP, etc. )  [] Prolonged immobility (hemiplegia/hemiparesis/post operative or any other extended immobilization)  [] Transferred from outside facility (Rehab or Long term care)  [] Age </= to 50

## 2023-03-20 ENCOUNTER — TRANSCRIPTION ENCOUNTER (OUTPATIENT)
Age: 48
End: 2023-03-20

## 2023-03-20 VITALS
DIASTOLIC BLOOD PRESSURE: 90 MMHG | HEART RATE: 88 BPM | OXYGEN SATURATION: 94 % | RESPIRATION RATE: 18 BRPM | TEMPERATURE: 99 F | SYSTOLIC BLOOD PRESSURE: 146 MMHG

## 2023-03-20 LAB
ANA TITR SER: NEGATIVE — SIGNIFICANT CHANGE UP
ANION GAP SERPL CALC-SCNC: 13 MMOL/L — SIGNIFICANT CHANGE UP (ref 5–17)
BUN SERPL-MCNC: 14 MG/DL — SIGNIFICANT CHANGE UP (ref 7–23)
CALCIUM SERPL-MCNC: 9.1 MG/DL — SIGNIFICANT CHANGE UP (ref 8.4–10.5)
CHLORIDE SERPL-SCNC: 100 MMOL/L — SIGNIFICANT CHANGE UP (ref 96–108)
CO2 SERPL-SCNC: 21 MMOL/L — LOW (ref 22–31)
CREAT SERPL-MCNC: 0.9 MG/DL — SIGNIFICANT CHANGE UP (ref 0.5–1.3)
DSDNA AB SER-ACNC: <12 IU/ML — SIGNIFICANT CHANGE UP
EGFR: 105 ML/MIN/1.73M2 — SIGNIFICANT CHANGE UP
GLUCOSE SERPL-MCNC: 176 MG/DL — HIGH (ref 70–99)
HCT VFR BLD CALC: 38.8 % — LOW (ref 39–50)
HGB BLD-MCNC: 12.7 G/DL — LOW (ref 13–17)
MCHC RBC-ENTMCNC: 27 PG — SIGNIFICANT CHANGE UP (ref 27–34)
MCHC RBC-ENTMCNC: 32.7 GM/DL — SIGNIFICANT CHANGE UP (ref 32–36)
MCV RBC AUTO: 82.4 FL — SIGNIFICANT CHANGE UP (ref 80–100)
NRBC # BLD: 0 /100 WBCS — SIGNIFICANT CHANGE UP (ref 0–0)
PLATELET # BLD AUTO: 211 K/UL — SIGNIFICANT CHANGE UP (ref 150–400)
POTASSIUM SERPL-MCNC: 3.7 MMOL/L — SIGNIFICANT CHANGE UP (ref 3.5–5.3)
POTASSIUM SERPL-SCNC: 3.7 MMOL/L — SIGNIFICANT CHANGE UP (ref 3.5–5.3)
RBC # BLD: 4.71 M/UL — SIGNIFICANT CHANGE UP (ref 4.2–5.8)
RBC # FLD: 12.1 % — SIGNIFICANT CHANGE UP (ref 10.3–14.5)
SARS-COV-2 RNA SPEC QL NAA+PROBE: SIGNIFICANT CHANGE UP
SODIUM SERPL-SCNC: 134 MMOL/L — LOW (ref 135–145)
WBC # BLD: 8.61 K/UL — SIGNIFICANT CHANGE UP (ref 3.8–10.5)
WBC # FLD AUTO: 8.61 K/UL — SIGNIFICANT CHANGE UP (ref 3.8–10.5)

## 2023-03-20 PROCEDURE — 86140 C-REACTIVE PROTEIN: CPT

## 2023-03-20 PROCEDURE — 86036 ANCA SCREEN EACH ANTIBODY: CPT

## 2023-03-20 PROCEDURE — 86431 RHEUMATOID FACTOR QUANT: CPT

## 2023-03-20 PROCEDURE — 97166 OT EVAL MOD COMPLEX 45 MIN: CPT

## 2023-03-20 PROCEDURE — 70496 CT ANGIOGRAPHY HEAD: CPT | Mod: MG

## 2023-03-20 PROCEDURE — 86235 NUCLEAR ANTIGEN ANTIBODY: CPT

## 2023-03-20 PROCEDURE — 92523 SPEECH SOUND LANG COMPREHEN: CPT

## 2023-03-20 PROCEDURE — U0005: CPT

## 2023-03-20 PROCEDURE — 99222 1ST HOSP IP/OBS MODERATE 55: CPT

## 2023-03-20 PROCEDURE — 80048 BASIC METABOLIC PNL TOTAL CA: CPT

## 2023-03-20 PROCEDURE — 93306 TTE W/DOPPLER COMPLETE: CPT

## 2023-03-20 PROCEDURE — 70544 MR ANGIOGRAPHY HEAD W/O DYE: CPT

## 2023-03-20 PROCEDURE — 36415 COLL VENOUS BLD VENIPUNCTURE: CPT

## 2023-03-20 PROCEDURE — 70450 CT HEAD/BRAIN W/O DYE: CPT | Mod: MA

## 2023-03-20 PROCEDURE — 85025 COMPLETE CBC W/AUTO DIFF WBC: CPT

## 2023-03-20 PROCEDURE — 86038 ANTINUCLEAR ANTIBODIES: CPT

## 2023-03-20 PROCEDURE — 97161 PT EVAL LOW COMPLEX 20 MIN: CPT

## 2023-03-20 PROCEDURE — 80053 COMPREHEN METABOLIC PANEL: CPT

## 2023-03-20 PROCEDURE — 85652 RBC SED RATE AUTOMATED: CPT

## 2023-03-20 PROCEDURE — 83036 HEMOGLOBIN GLYCOSYLATED A1C: CPT

## 2023-03-20 PROCEDURE — 96374 THER/PROPH/DIAG INJ IV PUSH: CPT

## 2023-03-20 PROCEDURE — 87637 SARSCOV2&INF A&B&RSV AMP PRB: CPT

## 2023-03-20 PROCEDURE — G1004: CPT

## 2023-03-20 PROCEDURE — U0003: CPT

## 2023-03-20 PROCEDURE — 80061 LIPID PANEL: CPT

## 2023-03-20 PROCEDURE — 70498 CT ANGIOGRAPHY NECK: CPT | Mod: MG

## 2023-03-20 PROCEDURE — 82962 GLUCOSE BLOOD TEST: CPT

## 2023-03-20 PROCEDURE — 86225 DNA ANTIBODY NATIVE: CPT

## 2023-03-20 PROCEDURE — 99285 EMERGENCY DEPT VISIT HI MDM: CPT | Mod: 25

## 2023-03-20 PROCEDURE — 70551 MRI BRAIN STEM W/O DYE: CPT

## 2023-03-20 PROCEDURE — 70547 MR ANGIOGRAPHY NECK W/O DYE: CPT

## 2023-03-20 PROCEDURE — 85027 COMPLETE CBC AUTOMATED: CPT

## 2023-03-20 PROCEDURE — 86255 FLUORESCENT ANTIBODY SCREEN: CPT

## 2023-03-20 RX ORDER — ISOPROPYL ALCOHOL, BENZOCAINE .7; .06 ML/ML; ML/ML
1 SWAB TOPICAL
Qty: 100 | Refills: 1
Start: 2023-03-20 | End: 2023-05-08

## 2023-03-20 RX ORDER — ATORVASTATIN CALCIUM 80 MG/1
1 TABLET, FILM COATED ORAL
Qty: 90 | Refills: 0
Start: 2023-03-20 | End: 2023-06-17

## 2023-03-20 RX ORDER — ASPIRIN/CALCIUM CARB/MAGNESIUM 324 MG
1 TABLET ORAL
Qty: 90 | Refills: 0
Start: 2023-03-20 | End: 2023-06-17

## 2023-03-20 RX ADMIN — Medication 1: at 07:42

## 2023-03-20 RX ADMIN — Medication 4: at 11:52

## 2023-03-20 RX ADMIN — Medication 81 MILLIGRAM(S): at 11:53

## 2023-03-20 NOTE — PROGRESS NOTE ADULT - ASSESSMENT
48 YO (1975) man with a PMHx significant for HTN and DM who presented to the ED for worsening Dizziness. Starting on 3/15 early morning pt woke up from sleep with sweating and room spinning dizziness which then only became symptomatic upon movement. Pt went to [facility] and received a CT, CTA which he says was negative, was discharged on meclizine. While at home, pt continued to be symptomatic upon ambulation and had one episode of vomiting, went to [facility] for further evaluation. Pt denied any vision changes, numbness, weakness, slurred speech. While in the ED, /100 other vss. NIHSS 0, MRS 0. 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 ,  titrate statin to LDL goal less than 70. Continue Lipitor 80 bedtime. MRI Brain w/o, MRA Head w/o and Neck w/contrast as above. Continue ASA 81mg daily life long. Physical therapy/OT recommended outpatient physical therapy with rolling walker. Repeat PT/OT assessment for Rehab.     ANTITHROMBOTIC THERAPY: Continue ASA     PULMONARY: CXR clear, protecting airway, saturating well     CARDIOVASCULAR: TTE: EF: 60%. Continue cardiac monitoring                              SBP goal: Less than 160    GASTROINTESTINAL: Passed dysphagia screen       Diet: Regular    RENAL: BUN/Cr: 13/0.83, good urine output      Na Goal: Greater than 135     Grimes: NO    HEMATOLOGY: H/H stable, Platelets normal      DVT ppx: Lovenox 40 subq    ID: afebrile, no leukocytosis     OTHER: Discussed medical condition with patient and wife at bedside     DISPOSITION: Outpatient PT with rolling walker once stable and workup is complete    CORE MEASURES:        Admission NIHSS: 0     TPA: [] YES [x] NO      LDL/HDL: 114/150     Depression Screen: N/A     Statin Therapy: Yes     Dysphagia Screen: [x] PASS [] FAIL     Smoking [] YES [x] NO      Afib [] YES [x] NO     Stroke Education [x] YES [] NO    Obtain screening lower extremity venous ultrasound in patients who meet 1 or more of the following criteria as patient is high risk for DVT/PE on admission:   [] History of DVT/PE  [] Hypercoagulable states (Factor V Leiden, Cancer, OCP, etc. )  [] Prolonged immobility (hemiplegia/hemiparesis/post operative or any other extended immobilization)  [] Transferred from outside facility (Rehab or Long term care)  [] Age </= to 50     48 YO (1975) man with a PMHx significant for HTN and DM who presented to the ED for worsening Dizziness. Starting on 3/15 early morning pt woke up from sleep with sweating and room spinning dizziness which then only became symptomatic upon movement. Pt went to [facility] and received a CT, CTA which he says was negative, was discharged on meclizine. While at home, pt continued to be symptomatic upon ambulation and had one episode of vomiting, went to [facility] for further evaluation. Pt denied any vision changes, numbness, weakness, slurred speech. While in the ED, /100 other vss. NIHSS 0, MRS 0. 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 ,  titrate statin to LDL goal less than 70. Continue Lipitor 80 bedtime. MRI Brain w/o, MRA Head w/o and Neck w/contrast as above. Continue ASA 81mg daily life long. Physical therapy/OT recommended outpatient physical therapy with rolling walker.     ANTITHROMBOTIC THERAPY: Continue ASA     PULMONARY: CXR clear, protecting airway, saturating well     CARDIOVASCULAR: TTE: EF: 60%. Continue cardiac monitoring                              SBP goal: Less than 160    GASTROINTESTINAL: Passed dysphagia screen       Diet: Regular    RENAL: BUN/Cr: 13/0.83, good urine output      Na Goal: Greater than 135     Grimes: NO    HEMATOLOGY: H/H stable, Platelets normal      DVT ppx: Lovenox 40 subq    ID: afebrile, no leukocytosis     OTHER: Discussed medical condition with patient and wife at bedside     DISPOSITION: Outpatient PT with rolling walker once stable and workup is complete    CORE MEASURES:        Admission NIHSS: 0     TPA: [] YES [x] NO      LDL/HDL: 114/150     Depression Screen: N/A     Statin Therapy: Yes     Dysphagia Screen: [x] PASS [] FAIL     Smoking [] YES [x] NO      Afib [] YES [x] NO     Stroke Education [x] YES [] NO    Obtain screening lower extremity venous ultrasound in patients who meet 1 or more of the following criteria as patient is high risk for DVT/PE on admission:   [] History of DVT/PE  [] Hypercoagulable states (Factor V Leiden, Cancer, OCP, etc. )  [] Prolonged immobility (hemiplegia/hemiparesis/post operative or any other extended immobilization)  [] Transferred from outside facility (Rehab or Long term care)  [] Age </= to 50

## 2023-03-20 NOTE — DISCHARGE NOTE NURSING/CASE MANAGEMENT/SOCIAL WORK - PATIENT PORTAL LINK FT
You can access the FollowMyHealth Patient Portal offered by [facility] by registering at the following website: [email or web address]. By joining [facility]’s FollowMyHealth portal, you will also be able to view your health information using other applications (apps) compatible with our system.

## 2023-03-20 NOTE — PROGRESS NOTE ADULT - REASON FOR ADMISSION
Vertebral dissection

## 2023-03-20 NOTE — DISCHARGE NOTE NURSING/CASE MANAGEMENT/SOCIAL WORK - NSDCDMETYPESERV_GEN_ALL_CORE_FT
Rolling walker,, to be delivered to bedside Rolling walker, shower chair. to be delivered once approved by insurance

## 2023-03-20 NOTE — PROGRESS NOTE ADULT - SUBJECTIVE AND OBJECTIVE BOX
THE PATIENT WAS SEEN AND EXAMINED BY ME WITH THE HOUSESTAFF AND STROKE TEAM DURING MORNING ROUNDS.   HPI:  HPI: Patient VINEET DUGAN is a 47y (1975) man with a PMHx significant for HTN and DM who presented to the ED for worsening Dizziness. Starting on 3/15 early morning pt woke up from sleep with sweating and room spinning dizziness which then only became symptomatic upon movement. Pt went to [facility] and received a CT, CTA which he says was negative, was discharged on meclizine. While at home, pt continued to be symptomatic upon ambulation and had one episode of vomiting, went to [facility] for further evaluation. Pt denied any vision changes, numbness, weakness, slurred speech.   While in the ED, /100 other vss. NIHSS 0, MRS 0. 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      (17 Mar 2023 06:53)      SUBJECTIVE: No events overnight.  No new neurologic complaints.      acetaminophen     Tablet .. 650 milliGRAM(s) Oral every 6 hours PRN  aspirin  chewable 81 milliGRAM(s) Oral daily  atorvastatin 80 milliGRAM(s) Oral at bedtime  dextrose 5%. 1000 milliLiter(s) IV Continuous <Continuous>  dextrose 5%. 1000 milliLiter(s) IV Continuous <Continuous>  dextrose 50% Injectable 25 Gram(s) IV Push once  dextrose 50% Injectable 12.5 Gram(s) IV Push once  dextrose 50% Injectable 25 Gram(s) IV Push once  dextrose Oral Gel 15 Gram(s) Oral once PRN  enoxaparin Injectable 40 milliGRAM(s) SubCutaneous every 24 hours  glucagon  Injectable 1 milliGRAM(s) IntraMuscular once  insulin lispro (ADMELOG) corrective regimen sliding scale   SubCutaneous three times a day before meals  insulin lispro (ADMELOG) corrective regimen sliding scale   SubCutaneous at bedtime  ondansetron Injectable 4 milliGRAM(s) IV Push every 6 hours PRN      PHYSICAL EXAM:   Vital Signs Last 24 Hrs  T(C): 36.9 (20 Mar 2023 05:00), Max: 36.9 (20 Mar 2023 05:00)  T(F): 98.4 (20 Mar 2023 05:00), Max: 98.4 (20 Mar 2023 05:00)  HR: 82 (20 Mar 2023 05:00) (66 - 95)  BP: 132/92 (20 Mar 2023 05:00) (128/89 - 152/100)  BP(mean): 114 (19 Mar 2023 12:00) (107 - 114)  RR: 18 (20 Mar 2023 05:00) (15 - 21)  SpO2: 97% (20 Mar 2023 05:00) (97% - 100%)    Parameters below as of 20 Mar 2023 05:00  Patient On (Oxygen Delivery Method): room air        General: No acute distress  HEENT: EOM intact, visual fields full  Abdomen: Soft, nontender, nondistended   Extremities: No edema    NEUROLOGICAL EXAM:  Mental status: Awake, alert, oriented x3, no aphasia, no neglect, normal memory   Cranial Nerves: No facial asymmetry, no nystagmus, no dysarthria,  tongue midline  Motor exam: Normal tone, no drift, 5/5 RUE, 5/5 RLE, 5/5 LUE, 5/5 LLE, normal fine finger movements.  Sensation: Intact to light touch   Coordination/ Gait: No dysmetria, DAYANNA intact and symmetric bilaterally    LABS:                        12.7     8.61  )-----------( 211      ( 20 Mar 2023 06:22 )             38.8    03-20    134<L>  |  100  |  14  ----------------------------<  176<H>  3.7   |  21<L>  |  0.90    Ca    9.1      20 Mar 2023 06:20      IMAGING: Reviewed by me.     MRI Head and MRA Head and Neck 3/17/23:   Acute large right medial cerebellar infarct. Nonvisualization   of the proximal right vertebral artery with some signal hyperintensity on   the T1 fat-sat images suggesting a proximal vertebral artery dissection   with reconstitution in the V2 portion with antegrade flow identified in   the V3 and v4 portions of the right vertebral artery.    CTA head and Neck and CT Head 3/17/23:   CT HEAD: Hypodense area within the right inferior cerebellum concerning   for acute infarction in the right PICA territory.    CTA BRAIN/NECK: Right vertebral dissection with occlusion of the right   PICA.    TTE 3/17/23:   Conclusions:  1. Increased relative wall thickness with normal left  ventricular mass index, consistent with concentric left  ventricular remodeling.  2. Endocardial visualization enhanced with intravenous  injection of Ultrasonic Enhancing Agent (Lumason). Normal  left ventricular systolic function. No segmental wall  motion abnormalities.  3. Normal right ventricular size and function.  4. Agitated saline injection demonstrates no evidence of a  patent foramen ovale.  *** No previous Echo exam.   THE PATIENT WAS SEEN AND EXAMINED BY ME WITH THE HOUSESTAFF AND STROKE TEAM DURING MORNING ROUNDS.     HPI: Patient VINEET DUGAN is a 47y (1975) man with a PMHx significant for HTN and DM who presented to the ED for worsening Dizziness. Starting on 3/15 early morning pt woke up from sleep with sweating and room spinning dizziness which then only became symptomatic upon movement. Pt went to [facility] and received a CT, CTA which he says was negative, was discharged on meclizine. While at home, pt continued to be symptomatic upon ambulation and had one episode of vomiting, went to [facility] for further evaluation. Pt denied any vision changes, numbness, weakness, slurred speech.   While in the ED, /100 other vss. NIHSS 0, MRS 0. 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     SUBJECTIVE: No events overnight.  No new neurologic complaints.      acetaminophen     Tablet .. 650 milliGRAM(s) Oral every 6 hours PRN  aspirin  chewable 81 milliGRAM(s) Oral daily  atorvastatin 80 milliGRAM(s) Oral at bedtime  dextrose 5%. 1000 milliLiter(s) IV Continuous <Continuous>  dextrose 5%. 1000 milliLiter(s) IV Continuous <Continuous>  dextrose 50% Injectable 25 Gram(s) IV Push once  dextrose 50% Injectable 12.5 Gram(s) IV Push once  dextrose 50% Injectable 25 Gram(s) IV Push once  dextrose Oral Gel 15 Gram(s) Oral once PRN  enoxaparin Injectable 40 milliGRAM(s) SubCutaneous every 24 hours  glucagon  Injectable 1 milliGRAM(s) IntraMuscular once  insulin lispro (ADMELOG) corrective regimen sliding scale   SubCutaneous three times a day before meals  insulin lispro (ADMELOG) corrective regimen sliding scale   SubCutaneous at bedtime  ondansetron Injectable 4 milliGRAM(s) IV Push every 6 hours PRN      PHYSICAL EXAM:   Vital Signs Last 24 Hrs  T(C): 36.9 (20 Mar 2023 05:00), Max: 36.9 (20 Mar 2023 05:00)  T(F): 98.4 (20 Mar 2023 05:00), Max: 98.4 (20 Mar 2023 05:00)  HR: 82 (20 Mar 2023 05:00) (66 - 95)  BP: 132/92 (20 Mar 2023 05:00) (128/89 - 152/100)  BP(mean): 114 (19 Mar 2023 12:00) (107 - 114)  RR: 18 (20 Mar 2023 05:00) (15 - 21)  SpO2: 97% (20 Mar 2023 05:00) (97% - 100%)    Parameters below as of 20 Mar 2023 05:00  Patient On (Oxygen Delivery Method): room air    General: No acute distress  HEENT: EOM intact, visual fields full  Abdomen: Soft, nontender, nondistended   Extremities: No edema    NEUROLOGICAL EXAM:  Mental status: Awake, alert, oriented x3, no aphasia, no neglect, normal memory   Cranial Nerves: No facial asymmetry, no nystagmus, no dysarthria,  tongue midline  Motor exam: Left sided head tilt. Normal tone, no drift, 5/5 RUE, 5/5 RLE, 5/5 LUE, 5/5 LLE, normal fine finger movements.   Sensation: Intact to light touch   Coordination/ Gait: No dysmetria. Gait not assessed     LABS:                        12.7     8.61  )-----------( 211      ( 20 Mar 2023 06:22 )             38.8    03-20    134<L>  |  100  |  14  ----------------------------<  176<H>  3.7   |  21<L>  |  0.90    Ca    9.1      20 Mar 2023 06:20      IMAGING: Reviewed by me.     MRI Head and MRA Head and Neck 3/17/23:   Acute large right medial cerebellar infarct. Nonvisualization   of the proximal right vertebral artery with some signal hyperintensity on   the T1 fat-sat images suggesting a proximal vertebral artery dissection   with reconstitution in the V2 portion with antegrade flow identified in   the V3 and v4 portions of the right vertebral artery.    CTA head and Neck and CT Head 3/17/23:   CT HEAD: Hypodense area within the right inferior cerebellum concerning   for acute infarction in the right PICA territory.    CTA BRAIN/NECK: Right vertebral dissection with occlusion of the right   PICA.    TTE 3/17/23:   Conclusions:  1. Increased relative wall thickness with normal left  ventricular mass index, consistent with concentric left  ventricular remodeling.  2. Endocardial visualization enhanced with intravenous  injection of Ultrasonic Enhancing Agent (Lumason). Normal  left ventricular systolic function. No segmental wall  motion abnormalities.  3. Normal right ventricular size and function.  4. Agitated saline injection demonstrates no evidence of a  patent foramen ovale.  *** No previous Echo exam.   THE PATIENT WAS SEEN AND EXAMINED BY ME WITH THE HOUSESTAFF AND STROKE TEAM DURING MORNING ROUNDS.     HPI: 47y (1975) man with a PMHx significant for HTN and DM who presented to the ED for worsening Dizziness. Starting on 3/15 early morning pt woke up from sleep with sweating and room spinning dizziness which then only became symptomatic upon movement. Pt went to [facility] and received a CT, CTA which he says was negative, was discharged on meclizine. While at home, pt continued to be symptomatic upon ambulation and had one episode of vomiting, went to [facility] for further evaluation. Pt denied any vision changes, numbness, weakness, slurred speech.   While in the ED, /100 other vss. NIHSS 0, MRS 0. 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     SUBJECTIVE: No events overnight.  No new neurologic complaints.      acetaminophen     Tablet .. 650 milliGRAM(s) Oral every 6 hours PRN  aspirin  chewable 81 milliGRAM(s) Oral daily  atorvastatin 80 milliGRAM(s) Oral at bedtime  dextrose 5%. 1000 milliLiter(s) IV Continuous <Continuous>  dextrose 5%. 1000 milliLiter(s) IV Continuous <Continuous>  dextrose 50% Injectable 25 Gram(s) IV Push once  dextrose 50% Injectable 12.5 Gram(s) IV Push once  dextrose 50% Injectable 25 Gram(s) IV Push once  dextrose Oral Gel 15 Gram(s) Oral once PRN  enoxaparin Injectable 40 milliGRAM(s) SubCutaneous every 24 hours  glucagon  Injectable 1 milliGRAM(s) IntraMuscular once  insulin lispro (ADMELOG) corrective regimen sliding scale   SubCutaneous three times a day before meals  insulin lispro (ADMELOG) corrective regimen sliding scale   SubCutaneous at bedtime  ondansetron Injectable 4 milliGRAM(s) IV Push every 6 hours PRN      PHYSICAL EXAM:   Vital Signs Last 24 Hrs  T(C): 36.9 (20 Mar 2023 05:00), Max: 36.9 (20 Mar 2023 05:00)  T(F): 98.4 (20 Mar 2023 05:00), Max: 98.4 (20 Mar 2023 05:00)  HR: 82 (20 Mar 2023 05:00) (66 - 95)  BP: 132/92 (20 Mar 2023 05:00) (128/89 - 152/100)  BP(mean): 114 (19 Mar 2023 12:00) (107 - 114)  RR: 18 (20 Mar 2023 05:00) (15 - 21)  SpO2: 97% (20 Mar 2023 05:00) (97% - 100%)    Parameters below as of 20 Mar 2023 05:00  Patient On (Oxygen Delivery Method): room air    General: No acute distress  HEENT: EOM intact, visual fields full  Abdomen: Soft, nontender, nondistended   Extremities: No edema    NEUROLOGICAL EXAM:  Mental status: Awake, alert, oriented x3, no aphasia, no neglect, normal memory   Cranial Nerves: No facial asymmetry, no nystagmus, no dysarthria,  tongue midline  Motor exam: Normal tone, no drift, 5/5 RUE, 5/5 RLE, 5/5 LUE, 5/5 LLE, normal fine finger movements.   Sensation: Intact to light touch   Coordination/ Gait: No dysmetria. Gait not assessed     LABS:                        12.7     8.61  )-----------( 211      ( 20 Mar 2023 06:22 )             38.8    03-20    134<L>  |  100  |  14  ----------------------------<  176<H>  3.7   |  21<L>  |  0.90    Ca    9.1      20 Mar 2023 06:20      IMAGING: Reviewed by me.     MRI Head and MRA Head and Neck 3/17/23:   Acute large right medial cerebellar infarct. Nonvisualization   of the proximal right vertebral artery with some signal hyperintensity on   the T1 fat-sat images suggesting a proximal vertebral artery dissection   with reconstitution in the V2 portion with antegrade flow identified in   the V3 and v4 portions of the right vertebral artery.    CTA head and Neck and CT Head 3/17/23:   CT HEAD: Hypodense area within the right inferior cerebellum concerning   for acute infarction in the right PICA territory.    CTA BRAIN/NECK: Right vertebral dissection with occlusion of the right   PICA.    TTE 3/17/23:   Conclusions:  1. Increased relative wall thickness with normal left  ventricular mass index, consistent with concentric left  ventricular remodeling.  2. Endocardial visualization enhanced with intravenous  injection of Ultrasonic Enhancing Agent (Lumason). Normal  left ventricular systolic function. No segmental wall  motion abnormalities.  3. Normal right ventricular size and function.  4. Agitated saline injection demonstrates no evidence of a  patent foramen ovale.  *** No previous Echo exam.   THE PATIENT WAS SEEN AND EXAMINED BY ME WITH THE HOUSESTAFF AND STROKE TEAM DURING MORNING ROUNDS.     HPI: 47y (1975) man with a PMHx significant for HTN and DM who presented to the ED for worsening Dizziness. Starting on 3/15 early morning pt woke up from sleep with sweating and room spinning dizziness which then only became symptomatic upon movement. Pt went to [facility] and received a CT, CTA which he says was negative, was discharged on meclizine. While at home, pt continued to be symptomatic upon ambulation and had one episode of vomiting, went to [facility] for further evaluation. Pt denied any vision changes, numbness, weakness, slurred speech.   While in the ED, /100 other vss. NIHSS 0, MRS 0. 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     SUBJECTIVE: No events overnight.  No new neurologic complaints.      acetaminophen     Tablet .. 650 milliGRAM(s) Oral every 6 hours PRN  aspirin  chewable 81 milliGRAM(s) Oral daily  atorvastatin 80 milliGRAM(s) Oral at bedtime  dextrose 5%. 1000 milliLiter(s) IV Continuous <Continuous>  dextrose 5%. 1000 milliLiter(s) IV Continuous <Continuous>  dextrose 50% Injectable 25 Gram(s) IV Push once  dextrose 50% Injectable 12.5 Gram(s) IV Push once  dextrose 50% Injectable 25 Gram(s) IV Push once  dextrose Oral Gel 15 Gram(s) Oral once PRN  enoxaparin Injectable 40 milliGRAM(s) SubCutaneous every 24 hours  glucagon  Injectable 1 milliGRAM(s) IntraMuscular once  insulin lispro (ADMELOG) corrective regimen sliding scale   SubCutaneous three times a day before meals  insulin lispro (ADMELOG) corrective regimen sliding scale   SubCutaneous at bedtime  ondansetron Injectable 4 milliGRAM(s) IV Push every 6 hours PRN      PHYSICAL EXAM:   Vital Signs Last 24 Hrs  T(C): 36.9 (20 Mar 2023 05:00), Max: 36.9 (20 Mar 2023 05:00)  T(F): 98.4 (20 Mar 2023 05:00), Max: 98.4 (20 Mar 2023 05:00)  HR: 82 (20 Mar 2023 05:00) (66 - 95)  BP: 132/92 (20 Mar 2023 05:00) (128/89 - 152/100)  BP(mean): 114 (19 Mar 2023 12:00) (107 - 114)  RR: 18 (20 Mar 2023 05:00) (15 - 21)  SpO2: 97% (20 Mar 2023 05:00) (97% - 100%)    Parameters below as of 20 Mar 2023 05:00  Patient On (Oxygen Delivery Method): room air    General: No acute distress  HEENT: EOM intact, visual fields full  Abdomen: Soft, nontender, nondistended   Extremities: No edema    NEUROLOGICAL EXAM:  Mental status: Awake, alert, oriented x3, no aphasia, no neglect, normal memory   Cranial Nerves: No facial asymmetry, no nystagmus, no dysarthria,  tongue midline  Motor exam: Normal tone, no drift, 5/5 RUE, 5/5 RLE, 5/5 LUE, 5/5 LLE, normal fine finger movements.   Sensation: Intact to light touch   Coordination/ Gait: No dysmetria. Gait not assessed     LABS:                        12.7     8.61  )-----------( 211      ( 20 Mar 2023 06:22 )             38.8    03-20    134<L>  |  100  |  14  ----------------------------<  176<H>  3.7   |  21<L>  |  0.90    Ca    9.1      20 Mar 2023 06:20      IMAGING: Reviewed by me.     CT Head 3/18/23:   Acute right cerebellar infarct.    MRI Head and MRA Head and Neck 3/17/23:   Acute large right medial cerebellar infarct. Nonvisualization   of the proximal right vertebral artery with some signal hyperintensity on   the T1 fat-sat images suggesting a proximal vertebral artery dissection   with reconstitution in the V2 portion with antegrade flow identified in   the V3 and v4 portions of the right vertebral artery.    CTA head and Neck and CT Head 3/17/23:   CT HEAD: Hypodense area within the right inferior cerebellum concerning   for acute infarction in the right PICA territory.    CTA BRAIN/NECK: Right vertebral dissection with occlusion of the right   PICA.    TTE 3/17/23:   Conclusions:  1. Increased relative wall thickness with normal left  ventricular mass index, consistent with concentric left  ventricular remodeling.  2. Endocardial visualization enhanced with intravenous  injection of Ultrasonic Enhancing Agent (Lumason). Normal  left ventricular systolic function. No segmental wall  motion abnormalities.  3. Normal right ventricular size and function.  4. Agitated saline injection demonstrates no evidence of a  patent foramen ovale.  *** No previous Echo exam.

## 2023-03-20 NOTE — PROGRESS NOTE ADULT - NS ATTEND AMEND GEN_ALL_CORE FT
Thirty five minutes of discharge time was spent on patient exam and discussion including test results, pathophysiology, natural history, stroke risk factors, medication changes and secondary prophylaxis and importance of medication compliance. We also discussed follow up plan. This was discussed with patient/family, who expresses understanding. DC home w/ ASA and outpatient PT.  Stressed compliance.

## 2023-03-20 NOTE — CONSULT NOTE ADULT - SUBJECTIVE AND OBJECTIVE BOX
Patient is a 47y old  Male who presents with a chief complaint of Vertebral dissection (20 Mar 2023 07:14)    Admission HPI:  HPI: Patient VINEET DUGAN is a 47y (1975) man with a PMHx significant for HTN and DM who presented to the ED for worsening Dizziness. Starting on 3/15 early morning pt woke up from sleep with sweating and room spinning dizziness which then only became symptomatic upon movement. Pt went to [facility] and received a CT, CTA which he says was negative, was discharged on meclizine. While at home, pt continued to be symptomatic upon ambulation and had one episode of vomiting, went to [facility] for further evaluation. Pt denied any vision changes, numbness, weakness, slurred speech.   While in the ED, /100 other vss. NIHSS 0, MRS 0. 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    (17 Mar 2023 06:53)    Interval History:  Patient had w/u   MR Angio Neck No Cont (03.17.23 @ 17:34) >  Acute large right medial cerebellar infarct. Nonvisualization   of the proximal right vertebral artery with some signal hyperintensity on   the T1 fat-sat images suggesting a proximal vertebral artery dissection   with reconstitution in the V2 portion with antegrade flow identified in   the V3 and v4 portions of the right vertebral artery.    REVIEW OF SYSTEMS: No chest pain, shortness of breath, nausea, vomiting or diarhea; other ROS neg     PAST MEDICAL & SURGICAL HISTORY  As above    FUNCTIONAL HISTORY:   Lives w wife in apartment w no stairs.  PTA Independent    CURRENT FUNCTIONAL STATUS:  Independent transfers, Supervision gait    FAMILY HISTORY   Neg    MEDICATIONS   acetaminophen     Tablet .. 650 milliGRAM(s) Oral every 6 hours PRN  aspirin  chewable 81 milliGRAM(s) Oral daily  atorvastatin 80 milliGRAM(s) Oral at bedtime  dextrose 5%. 1000 milliLiter(s) IV Continuous <Continuous>  dextrose 5%. 1000 milliLiter(s) IV Continuous <Continuous>  dextrose 50% Injectable 25 Gram(s) IV Push once  dextrose 50% Injectable 12.5 Gram(s) IV Push once  dextrose 50% Injectable 25 Gram(s) IV Push once  dextrose Oral Gel 15 Gram(s) Oral once PRN  enoxaparin Injectable 40 milliGRAM(s) SubCutaneous every 24 hours  glucagon  Injectable 1 milliGRAM(s) IntraMuscular once  insulin lispro (ADMELOG) corrective regimen sliding scale   SubCutaneous three times a day before meals  insulin lispro (ADMELOG) corrective regimen sliding scale   SubCutaneous at bedtime  ondansetron Injectable 4 milliGRAM(s) IV Push every 6 hours PRN      ALLERGIES  No Known Allergies    VITALS  T(C): 36.4 (03-20-23 @ 09:46), Max: 36.9 (03-20-23 @ 05:00)  HR: 87 (03-20-23 @ 09:46) (76 - 93)  BP: 143/103 (03-20-23 @ 09:46) (128/89 - 152/100)  RR: 20 (03-20-23 @ 09:46) (18 - 21)  SpO2: 99% (03-20-23 @ 09:46) (97% - 99%)  Wt(kg): --    PHYSICAL EXAM  Constitutional - NAD, Comfortable  HEENT - NCAT, EOMI  Neck - Supple, No limited ROM  Chest - CTA bilaterally, No wheeze, No rhonchi, No crackles  Cardiovascular - RRR, S1S2, No murmurs  Abdomen - BS+, Soft, NTND  Extremities - No C/C/E, No calf tenderness   Neurologic Exam -                    Cognitive - Awake, Alert, AAO to self, place, date, year, situation     Communication - Fluent, No dysarthria, no aphasia     Cranial Nerves - CN 2-12 intact     Motor - No focal deficits      Sensory - Intact to LT     Reflexes - DTR Intact, No primitive reflexive  Psychiatric - Mood stable, Affect WNL    RECENT LABS/IMAGING  CBC Full  -  ( 20 Mar 2023 06:22 )  WBC Count : 8.61 K/uL  RBC Count : 4.71 M/uL  Hemoglobin : 12.7 g/dL  Hematocrit : 38.8 %  Platelet Count - Automated : 211 K/uL  Mean Cell Volume : 82.4 fl  Mean Cell Hemoglobin : 27.0 pg  Mean Cell Hemoglobin Concentration : 32.7 gm/dL  Auto Neutrophil # : x  Auto Lymphocyte # : x  Auto Monocyte # : x  Auto Eosinophil # : x  Auto Basophil # : x  Auto Neutrophil % : x  Auto Lymphocyte % : x  Auto Monocyte % : x  Auto Eosinophil % : x  Auto Basophil % : x    03-20    134<L>  |  100  |  14  ----------------------------<  176<H>  3.7   |  21<L>  |  0.90    Ca    9.1      20 Mar 2023 06:20    Impression:  48 yo with functional deficits secondary to diagnosis of Cerebellar CVA    Plan:  PT- ROM, Bed Mob, Transfers, Amb w AD and bracing as needed  OT- ADLs, bracing  SLP- Dysphagia eval and treat  Prec- Falls, Cardiac  DVT Prophylaxis- Lovenox  Skin- Turn q2 h  Dispo-  Patient is a 47y old  Male who presents with a chief complaint of Vertebral dissection (20 Mar 2023 07:14)    Admission HPI:  HPI: Patient VINEET DUGAN is a 47y (1975) man with a PMHx significant for HTN and DM who presented to the ED for worsening Dizziness. Starting on 3/15 early morning pt woke up from sleep with sweating and room spinning dizziness which then only became symptomatic upon movement. Pt went to [facility] and received a CT, CTA which he says was negative, was discharged on meclizine. While at home, pt continued to be symptomatic upon ambulation and had one episode of vomiting, went to [facility] for further evaluation. Pt denied any vision changes, numbness, weakness, slurred speech.   While in the ED, /100 other vss. NIHSS 0, MRS 0. 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    (17 Mar 2023 06:53)    Interval History:  Patient had w/u   MR Angio Neck No Cont (03.17.23 @ 17:34) >  Acute large right medial cerebellar infarct. Nonvisualization   of the proximal right vertebral artery with some signal hyperintensity on   the T1 fat-sat images suggesting a proximal vertebral artery dissection   with reconstitution in the V2 portion with antegrade flow identified in   the V3 and v4 portions of the right vertebral artery.    REVIEW OF SYSTEMS: + feels weak, No chest pain, shortness of breath, nausea, vomiting or diarhea; other ROS neg     PAST MEDICAL & SURGICAL HISTORY  As above    FUNCTIONAL HISTORY:   Lives w wife in apartment w no stairs.  PTA Independent    CURRENT FUNCTIONAL STATUS:  Independent transfers, Supervision gait    FAMILY HISTORY   Neg    MEDICATIONS   acetaminophen     Tablet .. 650 milliGRAM(s) Oral every 6 hours PRN  aspirin  chewable 81 milliGRAM(s) Oral daily  atorvastatin 80 milliGRAM(s) Oral at bedtime  dextrose 5%. 1000 milliLiter(s) IV Continuous <Continuous>  dextrose 5%. 1000 milliLiter(s) IV Continuous <Continuous>  dextrose 50% Injectable 25 Gram(s) IV Push once  dextrose 50% Injectable 12.5 Gram(s) IV Push once  dextrose 50% Injectable 25 Gram(s) IV Push once  dextrose Oral Gel 15 Gram(s) Oral once PRN  enoxaparin Injectable 40 milliGRAM(s) SubCutaneous every 24 hours  glucagon  Injectable 1 milliGRAM(s) IntraMuscular once  insulin lispro (ADMELOG) corrective regimen sliding scale   SubCutaneous three times a day before meals  insulin lispro (ADMELOG) corrective regimen sliding scale   SubCutaneous at bedtime  ondansetron Injectable 4 milliGRAM(s) IV Push every 6 hours PRN      ALLERGIES  No Known Allergies    VITALS  T(C): 36.4 (03-20-23 @ 09:46), Max: 36.9 (03-20-23 @ 05:00)  HR: 87 (03-20-23 @ 09:46) (76 - 93)  BP: 143/103 (03-20-23 @ 09:46) (128/89 - 152/100)  RR: 20 (03-20-23 @ 09:46) (18 - 21)  SpO2: 99% (03-20-23 @ 09:46) (97% - 99%)  Wt(kg): --    PHYSICAL EXAM  Constitutional - NAD, Comfortable  HEENT - NCAT, EOMI  Neck - Supple, No limited ROM  Chest - CTA bilaterally, No wheeze, No rhonchi, No crackles  Cardiovascular - RRR, S1S2, No murmurs  Abdomen - BS+, Soft, NTND  Extremities - No C/C/E, No calf tenderness   Neurologic Exam -                 AAO x 3  Motor nml grade bl UE and LEs  No clonus  Sensation intact  Mild dysmetria LUE     Psychiatric - Mood stable, Affect WNL    Went from sitting to standing Independent; ambulated w SW 30' independent w slow but steady gait w good balance.     RECENT LABS/IMAGING  CBC Full  -  ( 20 Mar 2023 06:22 )  WBC Count : 8.61 K/uL  RBC Count : 4.71 M/uL  Hemoglobin : 12.7 g/dL  Hematocrit : 38.8 %  Platelet Count - Automated : 211 K/uL  Mean Cell Volume : 82.4 fl  Mean Cell Hemoglobin : 27.0 pg  Mean Cell Hemoglobin Concentration : 32.7 gm/dL  Auto Neutrophil # : x  Auto Lymphocyte # : x  Auto Monocyte # : x  Auto Eosinophil # : x  Auto Basophil # : x  Auto Neutrophil % : x  Auto Lymphocyte % : x  Auto Monocyte % : x  Auto Eosinophil % : x  Auto Basophil % : x    03-20    134<L>  |  100  |  14  ----------------------------<  176<H>  3.7   |  21<L>  |  0.90    Ca    9.1      20 Mar 2023 06:20    Impression:  48 yo with functional deficits secondary to diagnosis of Cerebellar CVA    Plan:  PT- ROM, Bed Mob, Transfers, Amb w AD and bracing as needed  OT- ADLs, bracing  SLP- Dysphagia eval and treat  Prec- Falls, Cardiac  DVT Prophylaxis- Lovenox  Skin- Turn q2 h  Dispo- OK for home w home PT  D/W Neuro NP, patient, and wife at bedside.

## 2023-03-29 ENCOUNTER — APPOINTMENT (OUTPATIENT)
Dept: ENDOCRINOLOGY | Facility: CLINIC | Age: 48
End: 2023-03-29
Payer: MEDICAID

## 2023-03-29 VITALS
WEIGHT: 173 LBS | DIASTOLIC BLOOD PRESSURE: 78 MMHG | HEART RATE: 71 BPM | RESPIRATION RATE: 14 BRPM | HEIGHT: 69.75 IN | OXYGEN SATURATION: 98 % | SYSTOLIC BLOOD PRESSURE: 110 MMHG | BODY MASS INDEX: 25.05 KG/M2

## 2023-03-29 DIAGNOSIS — Z78.9 OTHER SPECIFIED HEALTH STATUS: ICD-10-CM

## 2023-03-29 DIAGNOSIS — Z83.3 FAMILY HISTORY OF DIABETES MELLITUS: ICD-10-CM

## 2023-03-29 DIAGNOSIS — Z00.00 ENCOUNTER FOR GENERAL ADULT MEDICAL EXAMINATION W/OUT ABNORMAL FINDINGS: ICD-10-CM

## 2023-03-29 PROCEDURE — 99204 OFFICE O/P NEW MOD 45 MIN: CPT

## 2023-03-30 PROBLEM — Z78.9 NON-SMOKER: Status: ACTIVE | Noted: 2023-03-30

## 2023-03-30 PROBLEM — Z83.3 FAMILY HISTORY OF TYPE 2 DIABETES MELLITUS: Status: ACTIVE | Noted: 2023-03-30

## 2023-03-30 LAB
CREAT SPEC-SCNC: 82 MG/DL
MICROALBUMIN 24H UR DL<=1MG/L-MCNC: <1.2 MG/DL
MICROALBUMIN/CREAT 24H UR-RTO: NORMAL MG/G

## 2023-03-30 NOTE — HISTORY OF PRESENT ILLNESS
[FreeTextEntry1] : The patient is a 47 year old male being seen in the office today for evaluation of diabetes. Past medical hx significant for HTN and DM. 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 Following neurology. Was asked to follow up at Endocrinology clinic post discharge given T2DM hx.\par \par CHIEF COMPLAINT: T2DM\par REFERRED BY: post hospital discharge follow up \par \par DIABETES HPI:\par Type of Diabetes: T2DM\par Duration of Diabetes: about 14 years ago\par A1c: 3/17/2023 HgA1c 7.4%\par \par Current home regimen: \par Glipizide 5 mg once a day, was increased to Glipizide 5 mg BID about 4 days ago by PCP, reports compliancy with Glipizide for last 8 days, before hospitalization was not complaint with glipizide for many years per patient \par Past medications: reports may have been on Metformin in the past --> unsure why it was stopped, reports he may have felt foggy when taking Metformin \par \par Diabetes complications:\par Microvascular: [-] neuropathy, [-] nephropathy, [-] retinopathy\par Macrovascular: [-] CAD, [+] CVA, [-] PAD\par History of DKA/hospitalizations due to Diabetes: denies\par \par Last retinopathy screening: one year ago, denies retinopathy \par Last nephropathy screening (urine ACR): none one file, Cr and GFR from hospitalization wnl (see below)\par Last foot exam/podiatry visit: denies active issues with feet, does not see podiatry \par \par BG self monitoring: is checking FS once a day, brought in log book\par BG Trends:\par - Before Breakfast: 192, 205, 194, 179, 140 \par - Before Lunch: 306, 195\par \par Hypoglycemia events: denies hypoglycemic events \par \par Diet: 24 hr food recall:\par Breakfast: 2 roti and fried Lebanese snack, and milk\par Lunch: Millet roti and mung beans\par Dinner: pizza and orange juice \par Snacks: apple\par Drink: water \par Exercise: not currently exercising \par Steroid intake: denies\par \par Family history of diabetes: mother: diabetes\par Social history: currently working, no smoking hx, no etoh consumption \par \par Denies blurry vision, polydypsia, or polyuria\par \par Comorbidities: HTN, HLD\par \par HLD: atorvastatin 80mg once a day, reports compliancy  \par HTN: bisoprolol-hydrochlorothiazide 5mg-6.25mg once a day, reports compliancy  \par \par PERTINENT LABS from 3/2023 hospitalization\par Cr 0.90, \par Triglycerides 178\par HDL 42\par \par Total Non \par

## 2023-03-30 NOTE — REVIEW OF SYSTEMS
[Negative] : Heme/Lymph [Fatigue] : no fatigue [Recent Weight Gain (___ Lbs)] : no recent weight gain [Recent Weight Loss (___ Lbs)] : no recent weight loss [Eye Pain] : no pain [Blurred Vision] : no blurred vision [Dysphagia] : no dysphagia [Neck Pain] : no neck pain [Dysphonia] : no dysphonia [Chest Pain] : no chest pain [Palpitations] : no palpitations [Shortness Of Breath] : no shortness of breath [Nausea] : no nausea [Constipation] : no constipation [Abdominal Pain] : no abdominal pain [Vomiting] : no vomiting [Diarrhea] : no diarrhea [Polyuria] : no polyuria [Pain/Numbness of Digits] : no pain/numbness of digits [Polydipsia] : no polydipsia

## 2023-03-30 NOTE — ASSESSMENT
[FreeTextEntry1] : The patient is a 47 year old male being seen in the office today for evaluation of diabetes. Past medical hx significant for HTN and DM. 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 Was asked to follow up at Endocrinology clinic post discharge given T2DM hx.\par \par T2DM\par - A1c 7.4% 3/17/2023\par - Current regimen: Glipizide 5 mg once a day, was increased to Glipizide 5 mg BID about 4 days ago by PCP, reports compliancy with Glipizide for last 8 days, before hospitalization was not compliant with glipizide for many years per patient \par - reviewed BG log, only checking once a day\par - At this time with switch patient to Metformin (states he may have been on Metformin in the past, will re-trial): Reduce Glipizide to 5 mg in the morning. Start Metformin ER 500mg 2 tabs with dinner for 2 weeks and if tolerating well can increase to 2 tabs with breakfast and 2 tabs with dinner.\par - At next visit will plan to stop Glipizide. Can consider starting GLP-1 at next visit (CVA prevention benefit seen from GLP-1)\par - Recommended patient to increase BG check to 2-3 times per day: staggered BG checks: fasting, before meals, and occasional 2 hour post meal BG\par - Call office with highs/lows in BG \par - Last retinopathy screening: one year ago, denies retinopathy, recommend follow up with optho\par - Labs: recent labs from hospitalization reviewed\par - Preventive: \par  Nephropathy screening: will obtain [facility] today \par  Foot exam/podiatrist: david foot exam 3/29/2023\par - Counseling: We discussed diabetes foot care, long term complications of diabetes including but not limited to neuropathy, nephropathy, retinopathy and cardiovascular disease and the benefits of good glycemic control in preventing said complications. We discussed the risks and benefits of diabetes medications and/or insulin as relevant for today, prevention and management of hypoglycemia, importance of medication compliance and blood glucose monitoring.\par - Discussed diabetic diet, decreased intake of simple/processed sugars, increase intake of whole foods with protein and fiber. Increase physical activity as tolerated with cardiovascular exercise 150 min/per week consistently and resistance exercise three days per week. Consider CDE appointment, patient would like to hold off at this time. \par - I discussed the importance of avoiding mild hypoglycemia (FS<70 mg/dl) and severe hypoglycemia (FS<55 mg/dl) with the patient. I also discussed hypoglycemia correction with 4 oz of juice or 4 glucose tablets and rechecking FS in 15 minutes. If FS is still low, repeat 4oz juice or 4 glucose tablets and consume 12 grams of carbohydrates. \par Comorbidities: HTN, HLD\par \par HLD: \par - atorvastatin 80mg once a day, reports compliancy  \par - follow up with cardiology and neurology\par \par HTN: \par - bisoprolol-hydrochlorothiazide 5mg-6.25mg once a day, reports compliancy  \par - BP stable at today's visit\par - Will obtain urine UAC\par \par Follow up in 4-6 weeks with PA\par \par Jodie Royal PA-C\par [facility] \par \par \par \par \par \par

## 2023-03-30 NOTE — PHYSICAL EXAM
[Alert] : alert [Healthy Appearance] : healthy appearance [No Acute Distress] : no acute distress [Well Developed] : well developed [Normal Sclera/Conjunctiva] : normal sclera/conjunctiva [No Proptosis] : no proptosis [No Neck Mass] : no neck mass was observed [Supple] : the neck was supple [Thyroid Not Enlarged] : the thyroid was not enlarged [No Respiratory Distress] : no respiratory distress [No Accessory Muscle Use] : no accessory muscle use [Normal Rate and Effort] : normal respiratory rate and effort [Clear to Auscultation] : lungs were clear to auscultation bilaterally [Normal S1, S2] : normal S1 and S2 [Normal Rate] : heart rate was normal [Regular Rhythm] : with a regular rhythm [No Edema] : no peripheral edema [Not Tender] : non-tender [Not Distended] : not distended [Soft] : abdomen soft [Spine Straight] : spine straight [Normal Gait] : normal gait [No Involuntary Movements] : no involuntary movements were seen [Normal] : normal [Normal Sensation on Monofilament Testing] : normal sensation on monofilament testing of lower extremities [Oriented x3] : oriented to person, place, and time [Normal Affect] : the affect was normal [Diminished Throughout Both Feet] : normal tactile sensation with monofilament testing throughout both feet

## 2023-04-03 ENCOUNTER — APPOINTMENT (OUTPATIENT)
Dept: NEUROLOGY | Facility: CLINIC | Age: 48
End: 2023-04-03
Payer: MEDICAID

## 2023-04-03 VITALS
HEART RATE: 72 BPM | DIASTOLIC BLOOD PRESSURE: 80 MMHG | SYSTOLIC BLOOD PRESSURE: 117 MMHG | BODY MASS INDEX: 24.77 KG/M2 | WEIGHT: 173 LBS | HEIGHT: 70 IN

## 2023-04-03 DIAGNOSIS — Z86.79 PERSONAL HISTORY OF OTHER DISEASES OF THE CIRCULATORY SYSTEM: ICD-10-CM

## 2023-04-03 PROCEDURE — 99215 OFFICE O/P EST HI 40 MIN: CPT

## 2023-04-03 RX ORDER — LANCETS 30 GAUGE
EACH MISCELLANEOUS
Qty: 100 | Refills: 0 | Status: ACTIVE | COMMUNITY
Start: 2023-03-20

## 2023-04-03 RX ORDER — BLOOD SUGAR DIAGNOSTIC
STRIP MISCELLANEOUS
Qty: 100 | Refills: 0 | Status: ACTIVE | COMMUNITY
Start: 2023-03-20

## 2023-04-03 RX ORDER — ASPIRIN 81 MG/1
81 TABLET, CHEWABLE ORAL
Qty: 30 | Refills: 0 | Status: ACTIVE | COMMUNITY
Start: 2023-03-20

## 2023-04-03 RX ORDER — BISOPROLOL FUMARATE AND HYDROCHLOROTHIAZIDE 5; 6.25 MG/1; MG/1
5-6.25 TABLET, FILM COATED ORAL
Qty: 30 | Refills: 0 | Status: ACTIVE | COMMUNITY
Start: 2023-02-27

## 2023-04-03 RX ORDER — ATORVASTATIN CALCIUM 80 MG/1
80 TABLET, FILM COATED ORAL
Qty: 90 | Refills: 0 | Status: ACTIVE | COMMUNITY
Start: 2023-03-20

## 2023-04-03 RX ORDER — ALCOHOL ANTISEPTIC PADS
PADS, MEDICATED (EA) TOPICAL
Qty: 100 | Refills: 0 | Status: ACTIVE | COMMUNITY
Start: 2023-03-20

## 2023-04-03 NOTE — PHYSICAL EXAM
[General Appearance - Alert] : alert [General Appearance - In No Acute Distress] : in no acute distress [Oriented To Time, Place, And Person] : oriented to person, place, and time [Impaired Insight] : insight and judgment were intact [Affect] : the affect was normal [Sclera] : the sclera and conjunctiva were normal [Extraocular Movements] : extraocular movements were intact [Full Visual Field] : full visual field [Outer Ear] : the ears and nose were normal in appearance [Examination Of The Oral Cavity] : the lips and gums were normal [Neck Appearance] : the appearance of the neck was normal [Neck Cervical Mass (___cm)] : no neck mass was observed [Auscultation Breath Sounds / Voice Sounds] : lungs were clear to auscultation bilaterally [Heart Rate And Rhythm] : heart rate was normal and rhythm regular [Heart Sounds] : normal S1 and S2 [Heart Sounds Gallop] : no gallops [Murmurs] : no murmurs [Heart Sounds Pericardial Friction Rub] : no pericardial rub [Arterial Pulses Carotid] : carotid pulses were normal with no bruits [Edema] : there was no peripheral edema [Veins - Varicosity Changes] : there were no varicosital changes [Abnormal Walk] : normal gait [Nail Clubbing] : no clubbing  or cyanosis of the fingernails [Musculoskeletal - Swelling] : no joint swelling seen [Motor Tone] : muscle strength and tone were normal [Skin Color & Pigmentation] : normal skin color and pigmentation [Skin Turgor] : normal skin turgor [] : no rash [FreeTextEntry1] : Generally looked well. Mental status exam: Alert, oriented x3, speech fluent/prosodic without paraphasias; comprehension intact; memory and fund of knowledge intact. On cranial nerve exam, cranial nerves II through XII was intact. On motor exam tone was normal. There was no drift. Fine finger movements are intact. Power was normal throughout. Mild action tremor of the arms bilaterally. Reflexes were 1+ in the arms and knees and 2+ at the ankles, and plantar reflexes were downgoing. Coordination of the arms was normal. He turned in 3 steps and gait was otherwise normal. Tandem gait was normal. Romberg test was negative. Sensory exam was intact to all modalities. \par \par \par

## 2023-04-03 NOTE — CONSULT LETTER
[Dear  ___] : Dear  [unfilled], [Consult Letter:] : I had the pleasure of evaluating your patient, [unfilled]. [Please see my note below.] : Please see my note below. [Consult Closing:] : Thank you very much for allowing me to participate in the care of this patient.  If you have any questions, please do not hesitate to contact me. [Sincerely,] : Sincerely, [FreeTextEntry2] : Dr. Tim Justin [FreeTextEntry3] : Richard B. Libman, MD, FRCPC \par , Neurology \par Co-Director, Stroke Center\par Professor of Neurology\par [facility] [employer] [facility]\par

## 2023-04-03 NOTE — DISCUSSION/SUMMARY
[FreeTextEntry1] : Overall, he is neurologically intact.\par \par To summarize, he presented on 3/14/23 with vertigo due to a right cerebellar infarction, due to a  right vertebral artery and right PICA occlusion likely secondary to spontaneous vertebral artery dissection.\par \par He should continue to take Aspirin 81mg daily.  He will have a repeat MRI/MRA in 3 months (end of June) to  assess for resolution of the dissection. \par \par His stroke was likely non-atherosclerotic, and therefore his target LDL and indication for a statin should be based on his 10 year ASCVD risk. I defer to you on this clinical decision.\par \par He has been experiencing intermittent headaches, relieved by Tylenol.  These are most likely a sequela of his vertebral dissection and should gradually resolve.\par \par He endorses snoring, raising concern for obstructive sleep apnea. I have requested a home sleep study.\par \par He can follow up in 3 months with [facility]. I hope he remains free of further serious trouble.

## 2023-04-03 NOTE — HISTORY OF PRESENT ILLNESS
[FreeTextEntry1] : He is a 47 year old right handed gentleman with a past medical history of hypertension and diabetes.\par \par HPI from [facility] 3/16/23: HE presented with dizziness.  Starting on 3/15 early morning pt woke up from sleep with sweating and room spinning dizziness which then only became symptomatic upon movement. Pt went to [facility] and received a CT, CTA which he says was negative, was discharged on meclizine. While at home, pt continued to be symptomatic upon ambulation and had one episode of vomiting, went to [facility] for further evaluation.  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 \par \par Workup at [facility] includes:\par - MRI Head and MRA Head and Neck 3/17/23: \par To my eye, there was an acute large right medial cerebellar infarct. Nonvisualization of the proximal right vertebral artery with some signal hyperintensity on the T1 fat-sat images suggesting a proximal vertebral artery dissection with reconstitution in the V2 portion with antegrade flow identified in the V3 and v4 portions of the right vertebral artery.\par - CTA head and neck 3/17/23 Right vertebral dissection with occlusion of the right.\par PICA.\par \par - TTE 3/17/23: EF 60%, no PFO. Otherwise, unremarkable.\par \par 4/3/23\par He presents today with his wife. Following hospital discharge, he has been experiencing intermittent headaches, relieved with Tylenol. He denies any new focal neurologic symptoms.\par \par PCP Dr. Tim Justin

## 2023-04-27 ENCOUNTER — APPOINTMENT (OUTPATIENT)
Dept: ENDOCRINOLOGY | Facility: CLINIC | Age: 48
End: 2023-04-27
Payer: MEDICAID

## 2023-04-27 VITALS
BODY MASS INDEX: 24.77 KG/M2 | SYSTOLIC BLOOD PRESSURE: 110 MMHG | HEART RATE: 75 BPM | OXYGEN SATURATION: 98 % | DIASTOLIC BLOOD PRESSURE: 70 MMHG | WEIGHT: 173 LBS | HEIGHT: 70 IN

## 2023-04-27 PROCEDURE — 99214 OFFICE O/P EST MOD 30 MIN: CPT

## 2023-04-27 NOTE — ASSESSMENT
[FreeTextEntry1] : The patient is a 47 year old male being seen in the office today for follow up of T2DM. Past medical hx significant for HTN and DM. 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 Following neurology\par \par T2DM\par - A1c 7.4% 3/17/2023\par - Current regimen: Metformin ER 1000 mg BID\par - At this time recommend starting GLP-1 for BG control and CVA prevention benefit seen from GLP-1. Patient and his wife are not interested in GLP-1's at this time, refused.\par - Will obtain c-peptide, if wnl plan to start Farxiga or Jardiance \par - Continue BG check to 2-3 times per day: staggered BG checks: fasting, before meals, and occasional 2 hour post meal BG\par - Call office with highs/lows in BG \par - Last retinopathy screening: UTD\par - Labs: recent labs from hospitalization reviewed\par - Nephropathy screening: 3/2023 urine ACR wnl \par - Foot exam/podiatrist: wnl foot exam 3/29/2023\par - Counseling: We discussed diabetes foot care, long term complications of diabetes including but not limited to neuropathy, nephropathy, retinopathy and cardiovascular disease and the benefits of good glycemic control in preventing said complications. We discussed the risks and benefits of diabetes medications and/or insulin as relevant for today, prevention and management of hypoglycemia, importance of medication compliance and blood glucose monitoring.\par - Discussed diabetic diet, decreased intake of simple/processed sugars, increase intake of whole foods with protein and fiber. Increase physical activity as tolerated with cardiovascular exercise 150 min/per week consistently and resistance exercise three days per week.\par - Recommend CDE appointment, agreeable. \par \par HLD: \par - atorvastatin 80mg once a day, reports compliancy  \par - follow up with cardiology and neurology\par \par HTN: \par - bisoprolol-hydrochlorothiazide 5mg-6.25mg once a day, reports compliancy  \par - BP stable at today's visit\par -  urine ACR stable 3/2023\par \par Follow up in 4-6 weeks with PA\par \par Jodie Royal PA-C\par [facility] \par \par \par \par \par \par

## 2023-04-27 NOTE — PHYSICAL EXAM
[Alert] : alert [Healthy Appearance] : healthy appearance [No Acute Distress] : no acute distress [Well Developed] : well developed [Normal Sclera/Conjunctiva] : normal sclera/conjunctiva [No Proptosis] : no proptosis [No Neck Mass] : no neck mass was observed [Supple] : the neck was supple [Thyroid Not Enlarged] : the thyroid was not enlarged [No Respiratory Distress] : no respiratory distress [No Accessory Muscle Use] : no accessory muscle use [Normal Rate and Effort] : normal respiratory rate and effort [Clear to Auscultation] : lungs were clear to auscultation bilaterally [Normal S1, S2] : normal S1 and S2 [Normal Rate] : heart rate was normal [Regular Rhythm] : with a regular rhythm [No Edema] : no peripheral edema [Not Tender] : non-tender [Not Distended] : not distended [Soft] : abdomen soft [Spine Straight] : spine straight [Normal Gait] : normal gait [No Involuntary Movements] : no involuntary movements were seen [Normal] : normal [Normal Sensation on Monofilament Testing] : normal sensation on monofilament testing of lower extremities [Oriented x3] : oriented to person, place, and time [Normal Affect] : the affect was normal [Diminished Throughout Both Feet] : normal tactile sensation with monofilament testing throughout both feet [de-identified] : foot exam performed 3/29/2023 appointment

## 2023-04-27 NOTE — HISTORY OF PRESENT ILLNESS
[FreeTextEntry1] : The patient is a 47 year old male being seen in the office today for follow up of T2DM. Past medical hx significant for HTN and DM. 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 Following neurology. \par \par DIABETES HPI:\par Type of Diabetes: T2DM\par Duration of Diabetes: about 14 years ago\par A1c: 3/17/2023 HgA1c 7.4%\par \par Current home regimen: \par - At last visit patient was advised to reduce Glipizide to 5 mg in the morning and start Metformin ER 500mg 2 tabs with dinner for 2 weeks then increase to 2 tabs BID\par - States he is currently taking Metformin ER 500mg BID, stopped Glipizide about 2 weeks ago (when Metformin dose was increased)\par - Tolerating Metformin well, without side effects \par \par Diabetes complications:\par Microvascular: [-] neuropathy, [-] nephropathy, [-] retinopathy\par Macrovascular: [-] CAD, [+] CVA, [-] PAD\par History of DKA/hospitalizations due to Diabetes: denies\par \par Last retinopathy screenin2023 denies retinopathy (Dr. Beth Mclaughlin)\par Last nephropathy screening (urine ACR): 3/2023 wnl \par Last foot exam/podiatry visit: denies active issues with feet, does not see podiatry \par \par Check FS 2 times per day\par AM fastin, 198, 182, 158, 182, 178, 146, 166, 171, 172, 168, 155, 185, 184, 171, 159, 163, 192, 19, 188, 170, 174, 173, 181, 168, 175, 166, 187\par Before lunch: 181, 167\par 2 hour after lunch: 177\par Before dinner: 192, 202, 136, 139, 162, 139, 161\par 2 hours after dinner: 252, 168\par Bedtime: 273\par \par Hypoglycemia events: denies hypoglycemic events \par \par Diet: 24 hr food recall:\par Breakfast: 2 roti and fried  snack, and milk\par Lunch: Millet roti and mung beans\par Dinner: roti and vegetables \par Snacks: apple\par Drink: water \par Exercise: walking daily 20-30 mins daily \par Steroid intake: denies\par \par Family history of diabetes: mother: diabetes\par Social history: currently working, no smoking hx, no etoh consumption \par \par Denies blurry vision, polydypsia, or polyuria\par \par Comorbidities: HTN, HLD\par \par HLD: atorvastatin 80mg once a day, reports compliancy  \par HTN: bisoprolol-hydrochlorothiazide 5mg-6.25mg once a day, reports compliancy  \par \par PERTINENT LABS from 3/2023 hospitalization\par Cr 0.90, \par Triglycerides 178\par HDL 42\par \par Total Non \par

## 2023-05-01 LAB — C PEPTIDE SERPL-MCNC: 6.8 NG/ML

## 2023-05-01 NOTE — DISCHARGE NOTE NURSING/CASE MANAGEMENT/SOCIAL WORK - NSDCPEFALRISK_GEN_ALL_CORE
Lab Results   Component Value Date    HGBA1C 6 1 (H) 04/30/2023       Recent Labs     04/30/23  1531 04/30/23  2132 04/30/23  2231 05/01/23  0716   POCGLU 85 126 120 124     Blood Sugar Average: Last 72 hrs:  (P) 127 3125   Last hemoglobin A1c 6 2 in May 2022; Treated as outpatient with glipizide 5 mg daily  Hold oral diabetes medications and initiate insulin protocol during hospitalization  For information on Fall & Injury Prevention, visit: [email or web address] OR  [email or web address] OR  https://www.cdc.gov/steadi/patient.html

## 2023-05-24 ENCOUNTER — APPOINTMENT (OUTPATIENT)
Dept: NEUROLOGY | Facility: CLINIC | Age: 48
End: 2023-05-24
Payer: MEDICAID

## 2023-05-24 PROCEDURE — 95806 SLEEP STUDY UNATT&RESP EFFT: CPT

## 2023-06-01 ENCOUNTER — APPOINTMENT (OUTPATIENT)
Dept: ENDOCRINOLOGY | Facility: CLINIC | Age: 48
End: 2023-06-01

## 2023-06-01 ENCOUNTER — NON-APPOINTMENT (OUTPATIENT)
Age: 48
End: 2023-06-01

## 2023-06-02 RX ORDER — DAPAGLIFLOZIN 5 MG/1
5 TABLET, FILM COATED ORAL DAILY
Qty: 1 | Refills: 2 | Status: ACTIVE | COMMUNITY
Start: 2023-05-01

## 2023-06-06 ENCOUNTER — NON-APPOINTMENT (OUTPATIENT)
Age: 48
End: 2023-06-06

## 2023-06-21 ENCOUNTER — APPOINTMENT (OUTPATIENT)
Dept: MRI IMAGING | Facility: CLINIC | Age: 48
End: 2023-06-21
Payer: MEDICAID

## 2023-06-21 ENCOUNTER — OUTPATIENT (OUTPATIENT)
Dept: OUTPATIENT SERVICES | Facility: HOSPITAL | Age: 48
LOS: 1 days | End: 2023-06-21
Payer: MEDICAID

## 2023-06-21 DIAGNOSIS — I77.74 DISSECTION OF VERTEBRAL ARTERY: ICD-10-CM

## 2023-06-21 PROCEDURE — 70544 MR ANGIOGRAPHY HEAD W/O DYE: CPT | Mod: 26,59

## 2023-06-21 PROCEDURE — 70551 MRI BRAIN STEM W/O DYE: CPT | Mod: 26

## 2023-06-21 PROCEDURE — 70549 MR ANGIOGRAPH NECK W/O&W/DYE: CPT | Mod: 26

## 2023-06-21 PROCEDURE — 70549 MR ANGIOGRAPH NECK W/O&W/DYE: CPT

## 2023-06-21 PROCEDURE — A9585: CPT

## 2023-06-21 PROCEDURE — 70551 MRI BRAIN STEM W/O DYE: CPT

## 2023-06-21 PROCEDURE — 70544 MR ANGIOGRAPHY HEAD W/O DYE: CPT

## 2023-06-22 ENCOUNTER — NON-APPOINTMENT (OUTPATIENT)
Age: 48
End: 2023-06-22

## 2023-07-03 ENCOUNTER — APPOINTMENT (OUTPATIENT)
Dept: NEUROLOGY | Facility: CLINIC | Age: 48
End: 2023-07-03
Payer: MEDICAID

## 2023-07-03 PROCEDURE — 93892 TCD EMBOLI DETECT W/O INJ: CPT

## 2023-07-03 PROCEDURE — 93880 EXTRACRANIAL BILAT STUDY: CPT

## 2023-07-03 PROCEDURE — 93886 INTRACRANIAL COMPLETE STUDY: CPT

## 2023-07-06 ENCOUNTER — NON-APPOINTMENT (OUTPATIENT)
Age: 48
End: 2023-07-06

## 2023-07-11 ENCOUNTER — NON-APPOINTMENT (OUTPATIENT)
Age: 48
End: 2023-07-11

## 2023-09-20 ENCOUNTER — RESULT REVIEW (OUTPATIENT)
Age: 48
End: 2023-09-20

## 2023-09-20 ENCOUNTER — OUTPATIENT (OUTPATIENT)
Dept: OUTPATIENT SERVICES | Facility: HOSPITAL | Age: 48
LOS: 1 days | End: 2023-09-20
Payer: MEDICAID

## 2023-09-20 DIAGNOSIS — I77.74 DISSECTION OF VERTEBRAL ARTERY: ICD-10-CM

## 2023-09-20 PROCEDURE — 70551 MRI BRAIN STEM W/O DYE: CPT

## 2023-09-20 PROCEDURE — 70551 MRI BRAIN STEM W/O DYE: CPT | Mod: 26

## 2023-09-21 ENCOUNTER — NON-APPOINTMENT (OUTPATIENT)
Age: 48
End: 2023-09-21

## 2023-09-27 ENCOUNTER — APPOINTMENT (OUTPATIENT)
Dept: ENDOCRINOLOGY | Facility: CLINIC | Age: 48
End: 2023-09-27
Payer: MEDICAID

## 2023-09-27 VITALS
HEART RATE: 96 BPM | TEMPERATURE: 97 F | SYSTOLIC BLOOD PRESSURE: 100 MMHG | WEIGHT: 160 LBS | BODY MASS INDEX: 22.9 KG/M2 | HEIGHT: 70 IN | DIASTOLIC BLOOD PRESSURE: 60 MMHG | OXYGEN SATURATION: 99 %

## 2023-09-27 VITALS
WEIGHT: 165 LBS | SYSTOLIC BLOOD PRESSURE: 100 MMHG | BODY MASS INDEX: 23.62 KG/M2 | DIASTOLIC BLOOD PRESSURE: 60 MMHG | TEMPERATURE: 97 F | HEART RATE: 67 BPM | OXYGEN SATURATION: 97 % | HEIGHT: 70 IN

## 2023-09-27 DIAGNOSIS — I10 ESSENTIAL (PRIMARY) HYPERTENSION: ICD-10-CM

## 2023-09-27 DIAGNOSIS — E11.9 TYPE 2 DIABETES MELLITUS W/OUT COMPLICATIONS: ICD-10-CM

## 2023-09-27 LAB
GLUCOSE BLDC GLUCOMTR-MCNC: 203
HBA1C MFR BLD HPLC: 7.8

## 2023-09-27 PROCEDURE — 82962 GLUCOSE BLOOD TEST: CPT

## 2023-09-27 PROCEDURE — 99205 OFFICE O/P NEW HI 60 MIN: CPT | Mod: 25

## 2023-09-27 PROCEDURE — 83036 HEMOGLOBIN GLYCOSYLATED A1C: CPT | Mod: QW

## 2023-11-16 ENCOUNTER — APPOINTMENT (OUTPATIENT)
Dept: MRI IMAGING | Facility: CLINIC | Age: 48
End: 2023-11-16

## 2023-11-16 ENCOUNTER — OUTPATIENT (OUTPATIENT)
Dept: OUTPATIENT SERVICES | Facility: HOSPITAL | Age: 48
LOS: 1 days | End: 2023-11-16
Payer: SELF-PAY

## 2023-11-16 ENCOUNTER — APPOINTMENT (OUTPATIENT)
Dept: MRI IMAGING | Facility: CLINIC | Age: 48
End: 2023-11-16
Payer: MEDICAID

## 2023-11-16 ENCOUNTER — RESULT REVIEW (OUTPATIENT)
Age: 48
End: 2023-11-16

## 2023-11-16 ENCOUNTER — OUTPATIENT (OUTPATIENT)
Dept: OUTPATIENT SERVICES | Facility: HOSPITAL | Age: 48
LOS: 1 days | End: 2023-11-16
Payer: MEDICAID

## 2023-11-16 DIAGNOSIS — I77.74 DISSECTION OF VERTEBRAL ARTERY: ICD-10-CM

## 2023-11-16 DIAGNOSIS — I63.9 CEREBRAL INFARCTION, UNSPECIFIED: ICD-10-CM

## 2023-11-16 DIAGNOSIS — Z00.8 ENCOUNTER FOR OTHER GENERAL EXAMINATION: ICD-10-CM

## 2023-11-16 PROCEDURE — 70551 MRI BRAIN STEM W/O DYE: CPT | Mod: 26

## 2023-11-16 PROCEDURE — 70544 MR ANGIOGRAPHY HEAD W/O DYE: CPT | Mod: 26,59

## 2023-11-16 PROCEDURE — 70551 MRI BRAIN STEM W/O DYE: CPT

## 2023-11-16 PROCEDURE — 70549 MR ANGIOGRAPH NECK W/O&W/DYE: CPT

## 2023-11-16 PROCEDURE — 70544 MR ANGIOGRAPHY HEAD W/O DYE: CPT

## 2023-11-16 PROCEDURE — A9585: CPT

## 2023-11-16 PROCEDURE — 70549 MR ANGIOGRAPH NECK W/O&W/DYE: CPT | Mod: 26

## 2023-11-17 ENCOUNTER — NON-APPOINTMENT (OUTPATIENT)
Age: 48
End: 2023-11-17

## 2023-12-04 ENCOUNTER — APPOINTMENT (OUTPATIENT)
Dept: NEUROLOGY | Facility: CLINIC | Age: 48
End: 2023-12-04
Payer: MEDICAID

## 2023-12-04 VITALS
SYSTOLIC BLOOD PRESSURE: 110 MMHG | HEART RATE: 85 BPM | BODY MASS INDEX: 25.34 KG/M2 | DIASTOLIC BLOOD PRESSURE: 75 MMHG | HEIGHT: 70 IN | WEIGHT: 177 LBS | TEMPERATURE: 97.8 F

## 2023-12-04 PROCEDURE — 99204 OFFICE O/P NEW MOD 45 MIN: CPT

## 2023-12-04 RX ORDER — GLIPIZIDE 5 MG/1
5 TABLET ORAL
Qty: 30 | Refills: 0 | Status: DISCONTINUED | COMMUNITY
Start: 2023-02-27 | End: 2023-12-04

## 2024-02-07 ENCOUNTER — NON-APPOINTMENT (OUTPATIENT)
Age: 49
End: 2024-02-07

## 2024-02-08 NOTE — REASON FOR VISIT
[Home] : at home, [unfilled] , at the time of the visit. [Medical Office: ([facility])___] : at the medical office located in  [Patient] : the patient

## 2024-02-12 ENCOUNTER — APPOINTMENT (OUTPATIENT)
Dept: NEUROLOGY | Facility: CLINIC | Age: 49
End: 2024-02-12
Payer: MEDICAID

## 2024-02-12 DIAGNOSIS — G47.33 OBSTRUCTIVE SLEEP APNEA (ADULT) (PEDIATRIC): ICD-10-CM

## 2024-02-12 PROCEDURE — G2211 COMPLEX E/M VISIT ADD ON: CPT | Mod: NC,1L,95

## 2024-02-12 PROCEDURE — 99213 OFFICE O/P EST LOW 20 MIN: CPT | Mod: 95

## 2024-02-12 NOTE — HISTORY OF PRESENT ILLNESS
[FreeTextEntry1] : 12/4/23: Mr. Royal is a 48 year old man who is followed by Dr. Libman and was referred for sleep evaluation.  He is here today with his wife. He denies having any sleep concerns but was referred for a sleep study by Dr. Libman as part of a stroke workup. In March 2023 he presented with dizziness. 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  He reports sleeping for ~ 7 hours of sleep per night and waking up feeling refreshed. He denies daytime sleepiness. He does not take naps. He drinks one cup of tea in the morning.  His wife says that he snores intermittently.  Sometimes it can be loud, but usually it is not. She has not observed pauses in his breathing during sleep. He denies waking up feeling short of breath. He sleeps on his back or either side. He does not sleep prone. He denies waking up with a dry mouth, headache or sore throat.  He denies symptoms of Restless Legs Syndrome.  There is no history of dream enactment behavior, hypnagogic/hypnopompic hallucinations, sleep paralysis or cataplexy.  He had a home sleep study on 5/24/23 showing moderate SUZAN with an AHI of 16.7. Positional influence did not appear to be a significant factor.  His [place] Sleepiness Scale Score 0/24.  2/12/24:  He has used APAP the last 27/30 days. However, average usage is only 3 hours 14 minutes per night. He says that he is uncomfortable using APAP. He feels as if the pressure is too high. The AHI is 2.2. He has a nasal mask.  He is aware of leakage. He likes to sleep on his side.  He does not want to try a full face mask.  He feels that his sleep is not as good with APAP. He takes it off after a few hours and then goes back to sleep.

## 2024-02-12 NOTE — DISCUSSION/SUMMARY
[FreeTextEntry1] : Mr. Royal is a 48 year old man with a history of a right vertebral artery dissection and cerebellar stroke in March 2023. As part of his stroke work-up he had a home sleep study showing a moderate degree of obstructive sleep apnea with an apnea-hypopnea index of 16.7.  He is using auto-PAP. He is having some issues with comfort and shifting of the mask in his sleep. Although he is using it on most nights, the hours of usage are not ideal and he is not yet feeling a clinical benefit. -Will decrease the max pressure from 14 to 12 cm H2O to help with comfort.  -Will request a new mask - Respironics Dreamwear nasal cushion, which make it more easy to sleep on his side. This may not prevent the problem of leakage, but he does not want a full face mask at this time. If leakage persists, can try a chin strap. -Encouraged him to try to increase hours of usage.  -I have advised him to call me ~ 2 weeks after he gets his new mask and I can review compliance and efficacy data. f/u will be arranged.    f/u 4-6 months. He or his wife should feel free to call before that time with any questions or concerns.

## 2024-02-12 NOTE — PHYSICAL EXAM
[FreeTextEntry1] : Examination: Patient is well appearing Neurological Examination: Mental Status: Awake, alert. Oriented to person, place, time Language: No aphasia Cranial Nerves:  EOMI, No facial weakness, tongue protrudes in the midline Motor Exam: No pronator drift. Barrel roll intact. Fine motor movements intact in hands Sensory: intact on self exam Reflexes: Unable to examine Cerebellar: Finger to nose intact bilaterally

## 2024-03-02 ENCOUNTER — TRANSCRIPTION ENCOUNTER (OUTPATIENT)
Age: 49
End: 2024-03-02

## 2024-03-12 ENCOUNTER — APPOINTMENT (OUTPATIENT)
Dept: NEUROLOGY | Facility: CLINIC | Age: 49
End: 2024-03-12

## 2024-03-25 ENCOUNTER — APPOINTMENT (OUTPATIENT)
Dept: ENDOCRINOLOGY | Facility: CLINIC | Age: 49
End: 2024-03-25
Payer: MEDICAID

## 2024-03-25 VITALS
WEIGHT: 176 LBS | BODY MASS INDEX: 25.2 KG/M2 | DIASTOLIC BLOOD PRESSURE: 80 MMHG | OXYGEN SATURATION: 97 % | HEIGHT: 70 IN | HEART RATE: 78 BPM | SYSTOLIC BLOOD PRESSURE: 130 MMHG | TEMPERATURE: 97.9 F

## 2024-03-25 DIAGNOSIS — E78.5 HYPERLIPIDEMIA, UNSPECIFIED: ICD-10-CM

## 2024-03-25 DIAGNOSIS — E11.65 TYPE 2 DIABETES MELLITUS WITH HYPERGLYCEMIA: ICD-10-CM

## 2024-03-25 LAB
GLUCOSE BLDC GLUCOMTR-MCNC: 227
HBA1C MFR BLD HPLC: 8.4

## 2024-03-25 PROCEDURE — 82962 GLUCOSE BLOOD TEST: CPT

## 2024-03-25 PROCEDURE — 99215 OFFICE O/P EST HI 40 MIN: CPT | Mod: 25

## 2024-03-25 PROCEDURE — 83036 HEMOGLOBIN GLYCOSYLATED A1C: CPT | Mod: QW

## 2024-03-25 RX ORDER — METFORMIN ER 500 MG 500 MG/1
500 TABLET ORAL
Qty: 360 | Refills: 2 | Status: ACTIVE | COMMUNITY
Start: 2023-03-29 | End: 1900-01-01

## 2024-03-25 RX ORDER — DAPAGLIFLOZIN 10 MG/1
10 TABLET, FILM COATED ORAL
Qty: 90 | Refills: 3 | Status: ACTIVE | COMMUNITY
Start: 2024-03-25 | End: 1900-01-01

## 2024-03-25 RX ORDER — SITAGLIPTIN 100 MG/1
100 TABLET, FILM COATED ORAL DAILY
Qty: 30 | Refills: 3 | Status: ACTIVE | COMMUNITY
Start: 2024-03-25 | End: 1900-01-01

## 2024-03-25 NOTE — HISTORY OF PRESENT ILLNESS
[FreeTextEntry1] : The patient is a 47 year old male being seen in the office today for follow up of diabetes. Past medical hx significant for HTN and DM. 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 Following neurology. Was asked to follow up at Endocrinology clinic post discharge given T2DM hx.  DIABETES HPI:  Type of Diabetes: T2DM Duration of Diabetes: about 14 years ago A1c: 3/17/2023 HgA1c 7.4%--> 7.8% -> 8.4% today   Current home regimen: MFM 1000mg BID (increased from 500mg BID at last visit) + Farxiga 5mg daily   Diabetes complications: Microvascular: [-] neuropathy, [-] nephropathy, [-] retinopathy Macrovascular: [-] CAD, [+] CVA, [-] PAD History of DKA/hospitalizations due to Diabetes: denies   Last retinopathy screenin2023, denies retinopathy Last nephropathy screening (urine ACR): 3/2023 wnl, Cr and GFR from hospitalization wnl (see below) Last foot exam/podiatry visit: denies active issues with feet, does not see podiatry   BG self monitoring: is checking FS once a day, brought in log book  BG Trends: - Before Breakfast: 100s-140s   Hypoglycemia events: denies hypoglycemic events  Diet: 24 hr food recall: Breakfast: 2 roti and fried [place] snack, and milk Lunch:2 rotis with vegetables Dinner: 2 rotis with vegetables Drink: water,  Reports decreasing sugar intake  Exercise: not currently exercising  Steroid intake: denies   Family history of diabetes: mother: diabetes  Social history: currently working, no smoking hx, no etoh consumption  Denies blurry vision, polydypsia, or polyuria Comorbidities: HTN, HLD weight stable 176lbs, BMI 25  HLD: atorvastatin 80mg once a day, reports compliancy HTN: BP:130/80 today: bisoprolol-hydrochlorothiazide 5mg-6.25mg once a day, reports compliancy  PERTINENT LABS from 3/2023 hospitalization  Cr 0.90,  Triglycerides 178 HDL 42  Total Non

## 2024-03-25 NOTE — ASSESSMENT
[FreeTextEntry1] : #Type 2 diabetes -Patient with type 2 diabetes which is fairly under good control.  His last hemoglobin A1c went up from 7.8 to 8.4% today. -Patient is currently on metformin 1000 mg twice daily and Farxiga 5 mg daily -Fasting blood sugars are over 130 consistent with his A1c -Patient again states that he has not been working on his diet and diet is rich in carbohydrates.  Just had breakfast this morning and noted the blood sugar was 227 after breakfast -Ideally would like patient to be on a GLP-1 given history of CVA however patient is refusing any injections at this time Plan: -Continue with metformin 1000 mg twice daily -Increase Farxiga to 10 mg daily -Start Januvia 100 mg daily -We discussed diet changes to eat a more balanced diet with incorporation of more protein and fats with each meal -We spoke about the importance of exercise to help with insulin resistance -Return to office in 6 months -Patient states he recently got blood work done with his primary care doctor last week.  Will have it faxed over to our office  Patient counseled extensively about the complications of diabetes including but not limited to nephropathy, neuropathy, and retinopathy. We discussed the importance of annual foot and optho exams. Explained that ideally blood sugars in the morning prior to breakfast should be between 80 and 130. Blood sugars should be checked 2 hours after eating and should be <180. If blood sugar is <70, patient should treat the blood sugar FIRST and then contact provider. Advised patient to let us know if BG persistently <70 or >200  #Hyperlipidemia - continue with atorvastatin 80 mg daily  #Hypertension -Continue with current regimen

## 2024-06-17 ENCOUNTER — APPOINTMENT (OUTPATIENT)
Dept: NEUROLOGY | Facility: CLINIC | Age: 49
End: 2024-06-17
Payer: COMMERCIAL

## 2024-06-17 VITALS
HEART RATE: 71 BPM | BODY MASS INDEX: 25.2 KG/M2 | HEIGHT: 70 IN | DIASTOLIC BLOOD PRESSURE: 83 MMHG | WEIGHT: 176 LBS | SYSTOLIC BLOOD PRESSURE: 126 MMHG

## 2024-06-17 DIAGNOSIS — I63.9 CEREBRAL INFARCTION, UNSPECIFIED: ICD-10-CM

## 2024-06-17 DIAGNOSIS — I77.74 DISSECTION OF VERTEBRAL ARTERY: ICD-10-CM

## 2024-06-17 DIAGNOSIS — I65.01 OCCLUSION AND STENOSIS OF RIGHT VERTEBRAL ARTERY: ICD-10-CM

## 2024-06-17 PROCEDURE — 99215 OFFICE O/P EST HI 40 MIN: CPT | Mod: 25

## 2024-06-17 PROCEDURE — G2212 PROLONG OUTPT/OFFICE VIS: CPT

## 2024-06-17 PROCEDURE — G2211 COMPLEX E/M VISIT ADD ON: CPT | Mod: NC

## 2024-06-17 RX ORDER — LORAZEPAM 2 MG/1
2 TABLET ORAL
Qty: 4 | Refills: 0 | Status: DISCONTINUED | COMMUNITY
Start: 2023-09-22 | End: 2024-06-17

## 2024-06-17 RX ORDER — GLIPIZIDE 5 MG/1
5 TABLET ORAL
Refills: 0 | Status: ACTIVE | COMMUNITY

## 2024-06-17 NOTE — CONSULT LETTER
[FreeTextEntry2] : Dr. Tim Justin [FreeTextEntry3] : Richard B. Libman, MD, FRCPC \par  , Neurology \par  Co-Director, Stroke Center\par  Professor of Neurology\par  [employer] at [facility]\par

## 2024-06-17 NOTE — HISTORY OF PRESENT ILLNESS
[FreeTextEntry1] : He is a 49 year old right handed gentleman with a past medical history of hypertension and diabetes.  HPI from [facility] 3/16/23: He presented with dizziness.  Starting on 3/15 early morning pt woke up from sleep with sweating and room spinning dizziness which then only became symptomatic upon movement. Pt went to [facility] and received a CT, CTA which he says was negative, was discharged on meclizine. While at home, pt continued to be symptomatic upon ambulation and had one episode of vomiting, went to [facility] for further evaluation.  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   Workup at [facility] includes: - MRI Head and MRA Head and Neck 3/17/23:  To my eye, there was an acute large right medial cerebellar infarct. Nonvisualization of the proximal right vertebral artery with some signal hyperintensity on the T1 fat-sat images suggesting a proximal vertebral artery dissection with reconstitution in the V2 portion with antegrade flow identified in the V3 and v4 portions of the right vertebral artery. - CTA head and neck 3/17/23 Right vertebral dissection with occlusion of the right. PICA.  - TTE 3/17/23: EF 60%, no PFO. Otherwise, unremarkable.  4/3/23 He presents today with his wife. Following hospital discharge, he has been experiencing intermittent headaches, relieved with Tylenol. He denies any new focal neurologic symptoms.   - Repeat MRI brain (6/22/2023) to my eye showed the chronic right cerebellar infarct in the medial PICA distribution. - Repeat MRA neck and head (6/22/2023-compared to 3/23) to my eye showed probable occlusion of the right vertebral artery, with some retrograde flow in the distal right vertebral artery down to the level of the right PICA.  - Carotid Doppler (7/3/2023). Right-mild heterogeneous plaque with less than 40% stenosis; right ICA tortuous. Left-mild heterogeneous plaque with approximately 45% stenosis. Right vertebral artery-bidirectional flow poorly explained by his known right vertebral occlusion due to dissection (although may be associated with subclavian steal, which is unlikely); left vertebral artery-normal. - Transcranial Doppler (7/3/2023) showed increased velocity in the right MCA consistent with possible mild stenosis (not confirmed on his previous MRA or CTA, and possibly artifactual); remainder normal.  Repeat MRA neck and head (11/16/2023-compared to 6/21/2023) to my eye showed no change in the probable occlusion of the right vertebral artery; there might have been at least mild stenosis involving the intracranial left vertebral artery, proximal to the origin of PICA, but I suspect that this was artifact and due to tortuosity.  6/17/24 He presents to the office today with his wife.    PCP Dr. Tim Justin

## 2024-06-17 NOTE — PHYSICAL EXAM
[FreeTextEntry1] : Generally looked well. Mental status exam: Alert, oriented x3, speech fluent/prosodic without paraphasias; comprehension intact; memory and fund of knowledge intact. On cranial nerve exam, the palpebral fissure on the right is wider; otherwise, cranial nerves II through XII were intact. On motor exam tone was normal. There was no drift. Fine finger movements are intact. Power was normal throughout. Mild action tremor of the arms bilaterally. Reflexes were 1+ in the arms and knees and 2+ at the ankles, and plantar reflexes were downgoing. Coordination of the arms was normal. He turned in 3 steps and gait was otherwise normal. Tandem gait was normal. Romberg test was negative. Sensory exam was intact to all modalities.

## 2024-06-17 NOTE — DISCUSSION/SUMMARY
[FreeTextEntry1] : 4/3/23 Overall, he is neurologically intact.  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  He should continue to take Aspirin 81mg daily.  He will have a repeat MRI/MRA in 3 months (end of June) to  assess for resolution of the dissection.   His stroke was likely non-atherosclerotic, and therefore his target LDL and indication for a statin should be based on his 10 year ASCVD risk. I defer to you on this clinical decision.  He has been experiencing intermittent headaches, relieved by Tylenol.  These are most likely a sequela of his vertebral dissection and should gradually resolve.  He endorses snoring, raising concern for obstructive sleep apnea. I have requested a home sleep study.  6/22/23 chart note  Repeat MRI brain/MRA neck and head (6/22/2023) showed probable occlusion of the right vertebral artery. While the likelihood of recanalization of the right vertebral artery is low, I would suggest that he have repeat MRA neck and head in 3 months (roughly late 9/23) to monitor for changes. Following this there will be no role for further imaging unless he has new neurologic symptoms.   11/17/23 chart note Repeat MRA neck and head (11/16/2023) showed persistent probable occlusion of the right vertebral artery, following dissection. He should continue aspirin and at this point there is no role for further imaging.   6/17/24 - Overall he is neurologically intact and doing well. - He should continue to benefit from aggressive vascular risk factor control. - He is taking Aspirin for secondary stroke prevention. - His sleep study demonstrated obstructive sleep apnea, which he is having treated by Dr. Stovall (sleep specialist).  He can follow up in 1 year with Dopplers. I hope he remains free of further serious trouble.

## 2024-09-09 ENCOUNTER — NON-APPOINTMENT (OUTPATIENT)
Age: 49
End: 2024-09-09

## 2024-09-10 ENCOUNTER — APPOINTMENT (OUTPATIENT)
Dept: NEUROLOGY | Facility: CLINIC | Age: 49
End: 2024-09-10
Payer: COMMERCIAL

## 2024-09-10 DIAGNOSIS — G47.33 OBSTRUCTIVE SLEEP APNEA (ADULT) (PEDIATRIC): ICD-10-CM

## 2024-09-10 PROCEDURE — 99213 OFFICE O/P EST LOW 20 MIN: CPT | Mod: 95

## 2024-09-10 PROCEDURE — G2211 COMPLEX E/M VISIT ADD ON: CPT | Mod: 95

## 2024-09-10 NOTE — HISTORY OF PRESENT ILLNESS
[Home] : at home, [unfilled] , at the time of the visit. [Medical Office: ([facility])___] : at the medical office located in  [Verbal consent obtained from patient] : the patient, [unfilled] [FreeTextEntry1] : 12/4/23: Mr. Royal is a 48 year old man who is followed by Dr. Libman and was referred for sleep evaluation.  He is here today with his wife. He denies having any sleep concerns but was referred for a sleep study by Dr. Libman as part of a stroke workup. In March 2023 he presented with dizziness. 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  He reports sleeping for ~ 7 hours of sleep per night and waking up feeling refreshed. He denies daytime sleepiness. He does not take naps. He drinks one cup of tea in the morning.  His wife says that he snores intermittently.  Sometimes it can be loud, but usually it is not. She has not observed pauses in his breathing during sleep. He denies waking up feeling short of breath. He sleeps on his back or either side. He does not sleep prone. He denies waking up with a dry mouth, headache or sore throat.  He denies symptoms of Restless Legs Syndrome.  There is no history of dream enactment behavior, hypnagogic/hypnopompic hallucinations, sleep paralysis or cataplexy.  He had a home sleep study on 5/24/23 showing moderate SUZAN with an AHI of 16.7. Positional influence did not appear to be a significant factor.  His [place] Sleepiness Scale Score 0/24.  2/12/24:  He has used APAP the last 27/30 days. However, average usage is only 3 hours 14 minutes per night. He says that he is uncomfortable using APAP. He feels as if the pressure is too high. The AHI is 2.2. He has a nasal mask.  He is aware of leakage. He likes to sleep on his side.  He does not want to try a full face mask.  He feels that his sleep is not as good with APAP. He takes it off after a few hours and then goes back to sleep.  9/10/24: He was seen via telehealth while he was at work. There is no recent CPAP data. He says that he forgot to use CPAP. He also reports discomfort with the mask and requested a mask which does not involve the nose.  He repeatedly stated that he would like a mask that does not involve the nose.  Unfortunately family members were not available to assist at the tiem of the visit.

## 2024-09-10 NOTE — HISTORY OF PRESENT ILLNESS
[Home] : at home, [unfilled] , at the time of the visit. [Medical Office: ([facility])___] : at the medical office located in  [Verbal consent obtained from patient] : the patient, [unfilled] [FreeTextEntry1] : 12/4/23: Mr. Royal is a 48 year old man who is followed by Dr. Libman and was referred for sleep evaluation.  He is here today with his wife. He denies having any sleep concerns but was referred for a sleep study by Dr. Libman as part of a stroke workup. In March 2023 he presented with dizziness. 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  He reports sleeping for ~ 7 hours of sleep per night and waking up feeling refreshed. He denies daytime sleepiness. He does not take naps. He drinks one cup of tea in the morning.  His wife says that he snores intermittently.  Sometimes it can be loud, but usually it is not. She has not observed pauses in his breathing during sleep. He denies waking up feeling short of breath. He sleeps on his back or either side. He does not sleep prone. He denies waking up with a dry mouth, headache or sore throat.  He denies symptoms of Restless Legs Syndrome.  There is no history of dream enactment behavior, hypnagogic/hypnopompic hallucinations, sleep paralysis or cataplexy.  He had a home sleep study on 5/24/23 showing moderate SUZAN with an AHI of 16.7. Positional influence did not appear to be a significant factor.  His [place] Sleepiness Scale Score 0/24.  2/12/24:  He has used APAP the last 27/30 days. However, average usage is only 3 hours 14 minutes per night. He says that he is uncomfortable using APAP. He feels as if the pressure is too high. The AHI is 2.2. He has a nasal mask.  He is aware of leakage. He likes to sleep on his side.  He does not want to try a full face mask.  He feels that his sleep is not as good with APAP. He takes it off after a few hours and then goes back to sleep.  9/10/24: He was seen via telehealth while he was at work. There is no recent CPAP data. He says that he forgot to use CPAP. He also reports discomfort with the mask and requested a mask which does not involve the nose.  He repeatedly stated that he would like a mask that does not involve the nose.  Unfortunately family members were not available to assist at the tiem of the visit.

## 2024-09-10 NOTE — DISCUSSION/SUMMARY
[FreeTextEntry1] : Mr. Royal is a 48 year old man with a history of a right vertebral artery dissection and cerebellar stroke in March 2023. As part of his stroke work-up he had a home sleep study showing a moderate degree of obstructive sleep apnea with an apnea-hypopnea index of 16.7.  He has not used APAP recently. After the last visit, some changes were made to address his complaints. The max pressure was reduced from 14 to 12 cm H2O and an alternative mask was requested (Dreamwear nasal mask). The patient had difficulty discussing this during the visit. He said that family members were not reachable. I asked him if I can send him a letter with my suggestions which he can discuss with family members. He was agreeable to this option. I will send a letter discussing options for treatment of SUZAN including trying a different mask or referral to a dentist for an oral appliance. He or a family member can contact the office when a decision has been made on how to proceed.

## 2024-09-10 NOTE — PHYSICAL EXAM
[FreeTextEntry1] : Examination: Patient is well appearing Neurological Examination: Mental Status: Awake, alert. Oriented  Language: No aphasia Cranial Nerves:  EOMI, No facial weakness, tongue protrudes in the midline Motor Exam: No pronator drift. Barrel roll intact. Fine motor movements intact in hands Sensory: intact on self exam Reflexes: Unable to examine Cerebellar: Finger to nose intact bilaterally

## 2024-09-23 ENCOUNTER — APPOINTMENT (OUTPATIENT)
Dept: ENDOCRINOLOGY | Facility: CLINIC | Age: 49
End: 2024-09-23

## 2024-09-27 RX ORDER — ERTUGLIFLOZIN 5 MG/1
5 TABLET, FILM COATED ORAL
Qty: 90 | Refills: 2 | Status: ACTIVE | COMMUNITY
Start: 2024-09-27 | End: 1900-01-01

## 2025-02-18 ENCOUNTER — NON-APPOINTMENT (OUTPATIENT)
Age: 50
End: 2025-02-18

## 2025-03-28 ENCOUNTER — APPOINTMENT (OUTPATIENT)
Dept: ENDOCRINOLOGY | Facility: CLINIC | Age: 50
End: 2025-03-28
Payer: COMMERCIAL

## 2025-03-28 VITALS
DIASTOLIC BLOOD PRESSURE: 74 MMHG | SYSTOLIC BLOOD PRESSURE: 113 MMHG | WEIGHT: 174 LBS | OXYGEN SATURATION: 98 % | BODY MASS INDEX: 24.91 KG/M2 | HEIGHT: 70 IN | HEART RATE: 79 BPM

## 2025-03-28 DIAGNOSIS — I10 ESSENTIAL (PRIMARY) HYPERTENSION: ICD-10-CM

## 2025-03-28 DIAGNOSIS — E78.5 HYPERLIPIDEMIA, UNSPECIFIED: ICD-10-CM

## 2025-03-28 DIAGNOSIS — E11.65 TYPE 2 DIABETES MELLITUS WITH HYPERGLYCEMIA: ICD-10-CM

## 2025-03-28 LAB
GLUCOSE BLDC GLUCOMTR-MCNC: 216
HBA1C MFR BLD HPLC: 7.1

## 2025-03-28 PROCEDURE — 82962 GLUCOSE BLOOD TEST: CPT

## 2025-03-28 PROCEDURE — 99214 OFFICE O/P EST MOD 30 MIN: CPT

## 2025-03-28 PROCEDURE — 83036 HEMOGLOBIN GLYCOSYLATED A1C: CPT | Mod: QW

## 2025-03-28 RX ORDER — EMPAGLIFLOZIN 25 MG/1
25 TABLET, FILM COATED ORAL
Qty: 30 | Refills: 3 | Status: ACTIVE | COMMUNITY
Start: 2025-03-28 | End: 1900-01-01